# Patient Record
Sex: FEMALE | Race: WHITE | HISPANIC OR LATINO
[De-identification: names, ages, dates, MRNs, and addresses within clinical notes are randomized per-mention and may not be internally consistent; named-entity substitution may affect disease eponyms.]

---

## 2020-11-11 VITALS — BODY MASS INDEX: 43.99 KG/M2 | HEIGHT: 61 IN | WEIGHT: 233 LBS

## 2020-12-30 VITALS — HEIGHT: 61 IN | WEIGHT: 237 LBS | BODY MASS INDEX: 44.75 KG/M2

## 2021-01-29 VITALS — HEIGHT: 61 IN | WEIGHT: 240 LBS | BODY MASS INDEX: 45.31 KG/M2

## 2021-02-26 VITALS — HEIGHT: 61 IN | BODY MASS INDEX: 45.88 KG/M2 | WEIGHT: 243 LBS

## 2021-03-16 VITALS — BODY MASS INDEX: 45.31 KG/M2 | WEIGHT: 240 LBS | HEIGHT: 61 IN

## 2021-04-07 VITALS — HEIGHT: 61 IN | WEIGHT: 240 LBS | BODY MASS INDEX: 45.31 KG/M2

## 2021-05-07 VITALS — HEIGHT: 61 IN | BODY MASS INDEX: 45.5 KG/M2 | WEIGHT: 241 LBS

## 2021-06-01 ENCOUNTER — APPOINTMENT (OUTPATIENT)
Dept: SURGERY | Facility: CLINIC | Age: 61
End: 2021-06-01
Payer: MEDICAID

## 2021-06-01 VITALS
OXYGEN SATURATION: 96 % | SYSTOLIC BLOOD PRESSURE: 153 MMHG | HEIGHT: 61 IN | TEMPERATURE: 97.1 F | HEART RATE: 78 BPM | BODY MASS INDEX: 44.22 KG/M2 | DIASTOLIC BLOOD PRESSURE: 89 MMHG | WEIGHT: 234.25 LBS

## 2021-06-01 PROBLEM — Z00.00 ENCOUNTER FOR PREVENTIVE HEALTH EXAMINATION: Status: ACTIVE | Noted: 2021-06-01

## 2021-06-01 PROCEDURE — 99204 OFFICE O/P NEW MOD 45 MIN: CPT

## 2021-06-01 NOTE — HISTORY OF PRESENT ILLNESS
[de-identified] : Pt is a 61 year y/o Arabic-speaking F with PMHx of obesity (BMI 44), HTN, HLD, DM, cardiac arrhythmia, arthritis and PSHx of hysterectomy, tonsillectomy who presents today feeling well for initial bariatric consult. She has not done our online seminar. Pt has struggled with her weight for several years. She reports trying several diet and exercise regimens with no lasting weight loss success, including working with her PCP and a nutritionist for 7 months. Reports she would lose a few lbs but then gain even more back. States she walks for exercise daily. She denies smoking and drinking. She works as a home attendant. She is on Lisinopril 5 mg, Losartan 100 mg, Metoprolol 50 mg, amlodipine 10 mg, ASA 81 mg, Atorvastatin 40 mg and Januvia 100 mg. She occasionally takes Tylenol and cyclobenzaprine for joint pain. Pt states she recently did an EGD and colonoscopy.\par Patient to go for sleep study to evaluate any sleep disordered breathing associated with morbid obesity.

## 2021-06-01 NOTE — END OF VISIT
[FreeTextEntry3] : All medical record entries made by the Scribe were at my, Dr. Bob's, discretion and personally dictated by me on 06/01/2021. I have reviewed the chart and agree that the record accurately reflects my personal performance of the history, physical exam, assessment and plan. I have also personally directed, reviewed and agreed to the chart.

## 2021-06-01 NOTE — PLAN
[FreeTextEntry1] : Bariatric seminar\par Cardio clearance\par Send EGD and colonoscopy\par Will begin bariatric work up including sleep study to rule out any sleep disorders associated with morbid obesity.

## 2021-06-01 NOTE — ASSESSMENT
[FreeTextEntry1] : Pt is a 61 year y/o [place] Islander-speaking F with PMHx of obesity (BMI 44), HTN, HLD, DM, cardiac arrhythmia, arthritis and PSHx of hysterectomy, tonsillectomy who presents today feeling well for initial bariatric consult. Pt still needs to do our bariatric seminar. She will send us results from recent EGD and colonoscopy. She will require cardiac clearance. Will begin bariatric work up including sleep study to rule out any sleep disorders associated with morbid obesity.

## 2021-06-03 ENCOUNTER — NON-APPOINTMENT (OUTPATIENT)
Age: 61
End: 2021-06-03

## 2021-07-29 ENCOUNTER — APPOINTMENT (OUTPATIENT)
Dept: PULMONOLOGY | Facility: CLINIC | Age: 61
End: 2021-07-29
Payer: MEDICAID

## 2021-07-29 ENCOUNTER — OUTPATIENT (OUTPATIENT)
Dept: OUTPATIENT SERVICES | Facility: HOSPITAL | Age: 61
LOS: 1 days | End: 2021-07-29
Payer: MEDICAID

## 2021-07-29 VITALS
HEIGHT: 61 IN | SYSTOLIC BLOOD PRESSURE: 136 MMHG | TEMPERATURE: 97 F | DIASTOLIC BLOOD PRESSURE: 84 MMHG | WEIGHT: 231 LBS | RESPIRATION RATE: 12 BRPM | BODY MASS INDEX: 43.61 KG/M2 | HEART RATE: 75 BPM | OXYGEN SATURATION: 98 %

## 2021-07-29 DIAGNOSIS — Z01.818 ENCOUNTER FOR OTHER PREPROCEDURAL EXAMINATION: ICD-10-CM

## 2021-07-29 DIAGNOSIS — E78.2 MIXED HYPERLIPIDEMIA: ICD-10-CM

## 2021-07-29 DIAGNOSIS — Z86.79 PERSONAL HISTORY OF OTHER DISEASES OF THE CIRCULATORY SYSTEM: ICD-10-CM

## 2021-07-29 DIAGNOSIS — Z86.39 PERSONAL HISTORY OF OTHER ENDOCRINE, NUTRITIONAL AND METABOLIC DISEASE: ICD-10-CM

## 2021-07-29 PROCEDURE — ZZZZZ: CPT

## 2021-07-29 PROCEDURE — 94060 EVALUATION OF WHEEZING: CPT

## 2021-07-29 PROCEDURE — 99203 OFFICE O/P NEW LOW 30 MIN: CPT | Mod: 25

## 2021-07-29 PROCEDURE — 71046 X-RAY EXAM CHEST 2 VIEWS: CPT | Mod: 26

## 2021-07-29 PROCEDURE — 94726 PLETHYSMOGRAPHY LUNG VOLUMES: CPT

## 2021-07-29 PROCEDURE — 94729 DIFFUSING CAPACITY: CPT

## 2021-07-29 PROCEDURE — 71046 X-RAY EXAM CHEST 2 VIEWS: CPT

## 2021-07-29 NOTE — PHYSICAL EXAM
[No Acute Distress] : no acute distress [Normal Oropharynx] : normal oropharynx [III] : Mallampati Class: III [Normal Appearance] : normal appearance [No Neck Mass] : no neck mass [Normal Rate/Rhythm] : normal rate/rhythm [Normal S1, S2] : normal s1, s2 [No Murmurs] : no murmurs [No Resp Distress] : no resp distress [Clear to Auscultation Bilaterally] : clear to auscultation bilaterally [No Abnormalities] : no abnormalities [Benign] : benign [Normal Gait] : normal gait [No Clubbing] : no clubbing [No Edema] : no edema [Normal Color/ Pigmentation] : normal color/ pigmentation [No Focal Deficits] : no focal deficits [Oriented x3] : oriented x3 [Normal Affect] : normal affect

## 2021-07-30 RX ORDER — SITAGLIPTIN 100 MG/1
TABLET, FILM COATED ORAL
Refills: 0 | Status: ACTIVE | COMMUNITY

## 2021-07-30 RX ORDER — BISOPROLOL FUMARATE 5 MG/1
5 TABLET, FILM COATED ORAL
Refills: 0 | Status: ACTIVE | COMMUNITY

## 2021-07-30 RX ORDER — CYCLOBENZAPRINE HYDROCHLORIDE 10 MG/1
10 TABLET, FILM COATED ORAL
Refills: 0 | Status: ACTIVE | COMMUNITY

## 2021-07-30 RX ORDER — CYANOCOBALAMIN 1000 UG/ML
1000 INJECTION INTRAMUSCULAR; SUBCUTANEOUS
Refills: 0 | Status: ACTIVE | COMMUNITY

## 2021-07-30 RX ORDER — LOSARTAN POTASSIUM 100 MG/1
TABLET, FILM COATED ORAL
Refills: 0 | Status: ACTIVE | COMMUNITY

## 2021-07-30 RX ORDER — ATORVASTATIN CALCIUM 80 MG/1
TABLET, FILM COATED ORAL
Refills: 0 | Status: ACTIVE | COMMUNITY

## 2021-07-30 RX ORDER — OMEPRAZOLE 20 MG/1
TABLET, DELAYED RELEASE ORAL
Refills: 0 | Status: ACTIVE | COMMUNITY

## 2021-07-30 RX ORDER — ALPRAZOLAM 2 MG/1
TABLET ORAL
Refills: 0 | Status: ACTIVE | COMMUNITY

## 2021-07-30 RX ORDER — KETOROLAC TROMETHAMINE 10 MG/1
10 TABLET, FILM COATED ORAL
Refills: 0 | Status: ACTIVE | COMMUNITY

## 2021-07-30 NOTE — HISTORY OF PRESENT ILLNESS
[Never] : never [Obstructive Sleep Apnea] : obstructive sleep apnea [TextBox_4] : Ms Nichols, 61 year old female, presents to clinic for pulmonary preoperative risk stratification. States that she is to have gastric bariatric reduction surgery done soon. States that she is a never smoker with no hx of any lung issues. She denies feeling SOB and states that she is able to walk a couple of hours a day and climb 2 flights of stairs with minimal issues. She does admit to a hx of sleep apnea and seen a sleep doctor recently with  a AHI of 10 and planned for PAP therapy but has yet to receive a machine. States difficulty initiating sleep feels that she never really falls asleep and also awakens with a dry mouth but denies being told that she snores. Lives at home with no pets and denies any known exposure hx as well.\par \par PAP ordered by Dr. Wilhelm

## 2021-07-30 NOTE — ASSESSMENT
[FreeTextEntry1] : REVIEWED\par 1. PFTs 7/2021: FEV1 112; FEV1/; TLC 93; DLCO 102\par 2. CXR 7/2021: no obvious pleural or parenchymal abnormalities\par 3. PSG 6/2021: AHI 11.7; T 80-89 7%\par \par 61 year old female with DM, HTN, HLD, anxiety referred by Dr. Bob for pre operative risk assessment for bariatric surgery. Recently diagnosed with mild CYRUS and is pending receipt of PAP; managed by PCP Dr. Wilhelm. PFTs and CXR without concerning pathology.  No further optimization is required prior to proceeding with surgery. The anesthesia team should follow ASA CYRUS guidelines including extubation when fully awake and upright, judicious use of opiates, and prolonged post operative monitoring. Follow up with me as needed.\par \par \par \par

## 2021-07-30 NOTE — REVIEW OF SYSTEMS
[Diabetes] : diabetes [Fever] : no fever [Fatigue] : no fatigue [Chills] : no chills [Dry Eyes] : no dry eyes [Eye Irritation] : no eye irritation [Cough] : no cough [Sputum] : no sputum [Dyspnea] : no dyspnea [Chest Discomfort] : no chest discomfort [Orthopnea] : no orthopnea [Hay Fever] : no hay fever [Nasal Discharge] : no nasal discharge [GERD] : no gerd [Abdominal Pain] : no abdominal pain [Nocturia] : no nocturia [Dysuria] : no dysuria [Arthralgias] : no arthralgias [Anemia] : no anemia [Headache] : no headache [Dizziness] : no dizziness [Depression] : no depression

## 2021-07-30 NOTE — CONSULT LETTER
[Dear  ___] : Dear  [unfilled], [Consult Letter:] : I had the pleasure of evaluating your patient, [unfilled]. [Please see my note below.] : Please see my note below. [Consult Closing:] : Thank you very much for allowing me to participate in the care of this patient.  If you have any questions, please do not hesitate to contact me. [Sincerely,] : Sincerely, [FreeTextEntry3] : Aydee Joshi MD\par \par [facility] [facility] [employer] [facility]\par Pulmonary, Critical Care, and Sleep Medicine\par

## 2021-08-02 ENCOUNTER — APPOINTMENT (OUTPATIENT)
Dept: BARIATRICS | Facility: CLINIC | Age: 61
End: 2021-08-02
Payer: MEDICAID

## 2021-08-02 PROCEDURE — 98968 PH1 ASSMT&MGMT NQHP 21-30: CPT

## 2021-08-06 ENCOUNTER — APPOINTMENT (OUTPATIENT)
Dept: BARIATRICS | Facility: CLINIC | Age: 61
End: 2021-08-06
Payer: MEDICAID

## 2021-08-06 VITALS
OXYGEN SATURATION: 96 % | HEIGHT: 61 IN | TEMPERATURE: 97.1 F | WEIGHT: 231 LBS | HEART RATE: 73 BPM | BODY MASS INDEX: 43.61 KG/M2 | DIASTOLIC BLOOD PRESSURE: 81 MMHG | SYSTOLIC BLOOD PRESSURE: 122 MMHG

## 2021-08-06 DIAGNOSIS — G47.33 OBSTRUCTIVE SLEEP APNEA (ADULT) (PEDIATRIC): ICD-10-CM

## 2021-08-06 PROCEDURE — 99213 OFFICE O/P EST LOW 20 MIN: CPT

## 2021-08-06 NOTE — END OF VISIT
[FreeTextEntry3] : All medical record entries made by the Scribe were at my, Dr. Bob's, discretion and personally dictated by me on 08/06/2021. I have reviewed the chart and agree that the record accurately reflects my personal performance of the history, physical exam, assessment and plan. I have also personally directed, reviewed and agreed to the chart.

## 2021-08-06 NOTE — HISTORY OF PRESENT ILLNESS
[de-identified] : Pt is a 61 year y/o Gabonese-speaking F with PMHx of obesity (BMI 44), HTN, HLD, DM, cardiac arrhythmia, arthritis and PSHx of hysterectomy, tonsillectomy who presents today feeling well for final bariatric consult. Pt states she has completed her bariatric workup but doesn't know which surgery she is interested in. She was diagnosed with CYRUS and will start using a CPAP machine in a few days.

## 2021-08-06 NOTE — ASSESSMENT
[FreeTextEntry1] : Pt is a 61 year y/o Tunisian-speaking F with PMHx of obesity (BMI 44), HTN, HLD, DM, cardiac arrhythmia, arthritis and PSHx of hysterectomy, tonsillectomy who presents today feeling well for final bariatric consult, interested in gastric bypass.  The risks, benefits, and alternatives to the proposed procedure were discussed with the patient and all questions were answered to their satisfaction. Will schedule RYGB.

## 2021-09-02 ENCOUNTER — APPOINTMENT (OUTPATIENT)
Dept: BARIATRICS | Facility: CLINIC | Age: 61
End: 2021-09-02

## 2021-09-14 ENCOUNTER — NON-APPOINTMENT (OUTPATIENT)
Age: 61
End: 2021-09-14

## 2021-09-24 VITALS
HEART RATE: 67 BPM | SYSTOLIC BLOOD PRESSURE: 148 MMHG | OXYGEN SATURATION: 99 % | HEIGHT: 61.02 IN | RESPIRATION RATE: 16 BRPM | TEMPERATURE: 98 F | DIASTOLIC BLOOD PRESSURE: 53 MMHG | WEIGHT: 218.92 LBS

## 2021-09-24 RX ORDER — ASPIRIN/CALCIUM CARB/MAGNESIUM 324 MG
0 TABLET ORAL
Qty: 0 | Refills: 0 | DISCHARGE

## 2021-09-24 NOTE — PRE-OP CHECKLIST - SELECT TESTS ORDERED
BMP/CBC/PT/PTT/INR/Urinalysis/EKG/CXR BMP/CBC/PT/PTT/INR/Urinalysis/EKG/CXR/POCT Blood Glucose BMP/CBC/PT/PTT/INR/Urinalysis/EKG/CXR/POCT Blood Glucose/COVID-19

## 2021-09-27 ENCOUNTER — APPOINTMENT (OUTPATIENT)
Dept: BARIATRICS | Facility: HOSPITAL | Age: 61
End: 2021-09-27
Payer: MEDICAID

## 2021-09-27 ENCOUNTER — INPATIENT (INPATIENT)
Facility: HOSPITAL | Age: 61
LOS: 1 days | Discharge: HOME | DRG: 621 | End: 2021-09-29
Attending: SURGERY | Admitting: SURGERY
Payer: MEDICAID

## 2021-09-27 DIAGNOSIS — Z90.89 ACQUIRED ABSENCE OF OTHER ORGANS: Chronic | ICD-10-CM

## 2021-09-27 DIAGNOSIS — Z90.710 ACQUIRED ABSENCE OF BOTH CERVIX AND UTERUS: Chronic | ICD-10-CM

## 2021-09-27 DIAGNOSIS — Z98.890 OTHER SPECIFIED POSTPROCEDURAL STATES: Chronic | ICD-10-CM

## 2021-09-27 DIAGNOSIS — E66.01 MORBID (SEVERE) OBESITY DUE TO EXCESS CALORIES: ICD-10-CM

## 2021-09-27 LAB
BLD GP AB SCN SERPL QL: NEGATIVE — SIGNIFICANT CHANGE UP
GLUCOSE BLDC GLUCOMTR-MCNC: 184 MG/DL — HIGH (ref 70–99)
GLUCOSE BLDC GLUCOMTR-MCNC: 219 MG/DL — HIGH (ref 70–99)
GLUCOSE BLDC GLUCOMTR-MCNC: 235 MG/DL — HIGH (ref 70–99)
GLUCOSE BLDC GLUCOMTR-MCNC: 238 MG/DL — HIGH (ref 70–99)
HCT VFR BLD CALC: 35.5 % — SIGNIFICANT CHANGE UP (ref 34.5–45)
HGB BLD-MCNC: 11.6 G/DL — SIGNIFICANT CHANGE UP (ref 11.5–15.5)
MCHC RBC-ENTMCNC: 32.3 PG — SIGNIFICANT CHANGE UP (ref 27–34)
MCHC RBC-ENTMCNC: 32.7 GM/DL — SIGNIFICANT CHANGE UP (ref 32–36)
MCV RBC AUTO: 98.9 FL — SIGNIFICANT CHANGE UP (ref 80–100)
NRBC # BLD: 0 /100 WBCS — SIGNIFICANT CHANGE UP (ref 0–0)
PLATELET # BLD AUTO: 348 K/UL — SIGNIFICANT CHANGE UP (ref 150–400)
RBC # BLD: 3.59 M/UL — LOW (ref 3.8–5.2)
RBC # FLD: 14.5 % — SIGNIFICANT CHANGE UP (ref 10.3–14.5)
RH IG SCN BLD-IMP: NEGATIVE — SIGNIFICANT CHANGE UP
SARS-COV-2 RNA SPEC QL NAA+PROBE: SIGNIFICANT CHANGE UP
WBC # BLD: 16.98 K/UL — HIGH (ref 3.8–10.5)
WBC # FLD AUTO: 16.98 K/UL — HIGH (ref 3.8–10.5)

## 2021-09-27 PROCEDURE — S2900 ROBOTIC SURGICAL SYSTEM: CPT | Mod: NC

## 2021-09-27 PROCEDURE — 43645 LAP GASTR BYPASS INCL SMLL I: CPT | Mod: GC

## 2021-09-27 RX ORDER — METOPROLOL TARTRATE 50 MG
50 TABLET ORAL
Refills: 0 | Status: DISCONTINUED | OUTPATIENT
Start: 2021-09-27 | End: 2021-09-27

## 2021-09-27 RX ORDER — ACETAMINOPHEN 500 MG
650 TABLET ORAL EVERY 6 HOURS
Refills: 0 | Status: DISCONTINUED | OUTPATIENT
Start: 2021-09-27 | End: 2021-09-27

## 2021-09-27 RX ORDER — SODIUM CHLORIDE 9 MG/ML
1000 INJECTION, SOLUTION INTRAVENOUS
Refills: 0 | Status: DISCONTINUED | OUTPATIENT
Start: 2021-09-27 | End: 2021-09-29

## 2021-09-27 RX ORDER — ACETAMINOPHEN 500 MG
1000 TABLET ORAL ONCE
Refills: 0 | Status: COMPLETED | OUTPATIENT
Start: 2021-09-27 | End: 2021-09-27

## 2021-09-27 RX ORDER — ONDANSETRON 8 MG/1
4 TABLET, FILM COATED ORAL EVERY 6 HOURS
Refills: 0 | Status: DISCONTINUED | OUTPATIENT
Start: 2021-09-27 | End: 2021-09-29

## 2021-09-27 RX ORDER — ACETAMINOPHEN 500 MG
650 TABLET ORAL EVERY 6 HOURS
Refills: 0 | Status: DISCONTINUED | OUTPATIENT
Start: 2021-09-27 | End: 2021-09-29

## 2021-09-27 RX ORDER — AMLODIPINE BESYLATE 2.5 MG/1
5 TABLET ORAL DAILY
Refills: 0 | Status: DISCONTINUED | OUTPATIENT
Start: 2021-09-27 | End: 2021-09-27

## 2021-09-27 RX ORDER — KETOROLAC TROMETHAMINE 30 MG/ML
15 SYRINGE (ML) INJECTION EVERY 6 HOURS
Refills: 0 | Status: DISCONTINUED | OUTPATIENT
Start: 2021-09-27 | End: 2021-09-29

## 2021-09-27 RX ORDER — PANTOPRAZOLE SODIUM 20 MG/1
40 TABLET, DELAYED RELEASE ORAL DAILY
Refills: 0 | Status: DISCONTINUED | OUTPATIENT
Start: 2021-09-27 | End: 2021-09-29

## 2021-09-27 RX ORDER — HYDROMORPHONE HYDROCHLORIDE 2 MG/ML
0.5 INJECTION INTRAMUSCULAR; INTRAVENOUS; SUBCUTANEOUS ONCE
Refills: 0 | Status: DISCONTINUED | OUTPATIENT
Start: 2021-09-27 | End: 2021-09-27

## 2021-09-27 RX ORDER — INSULIN LISPRO 100/ML
VIAL (ML) SUBCUTANEOUS
Refills: 0 | Status: DISCONTINUED | OUTPATIENT
Start: 2021-09-27 | End: 2021-09-29

## 2021-09-27 RX ORDER — GABAPENTIN 400 MG/1
300 CAPSULE ORAL ONCE
Refills: 0 | Status: COMPLETED | OUTPATIENT
Start: 2021-09-27 | End: 2021-09-27

## 2021-09-27 RX ORDER — SCOPALAMINE 1 MG/3D
1 PATCH, EXTENDED RELEASE TRANSDERMAL ONCE
Refills: 0 | Status: COMPLETED | OUTPATIENT
Start: 2021-09-27 | End: 2021-09-27

## 2021-09-27 RX ORDER — DEXTROSE 50 % IN WATER 50 %
25 SYRINGE (ML) INTRAVENOUS ONCE
Refills: 0 | Status: DISCONTINUED | OUTPATIENT
Start: 2021-09-27 | End: 2021-09-29

## 2021-09-27 RX ORDER — BUPIVACAINE 13.3 MG/ML
20 INJECTION, SUSPENSION, LIPOSOMAL INFILTRATION ONCE
Refills: 0 | Status: DISCONTINUED | OUTPATIENT
Start: 2021-09-27 | End: 2021-09-29

## 2021-09-27 RX ORDER — ENOXAPARIN SODIUM 100 MG/ML
40 INJECTION SUBCUTANEOUS ONCE
Refills: 0 | Status: COMPLETED | OUTPATIENT
Start: 2021-09-27 | End: 2021-09-27

## 2021-09-27 RX ORDER — DEXTROSE 50 % IN WATER 50 %
12.5 SYRINGE (ML) INTRAVENOUS ONCE
Refills: 0 | Status: DISCONTINUED | OUTPATIENT
Start: 2021-09-27 | End: 2021-09-29

## 2021-09-27 RX ORDER — GLUCAGON INJECTION, SOLUTION 0.5 MG/.1ML
1 INJECTION, SOLUTION SUBCUTANEOUS ONCE
Refills: 0 | Status: DISCONTINUED | OUTPATIENT
Start: 2021-09-27 | End: 2021-09-29

## 2021-09-27 RX ORDER — SODIUM CHLORIDE 9 MG/ML
1000 INJECTION, SOLUTION INTRAVENOUS
Refills: 0 | Status: DISCONTINUED | OUTPATIENT
Start: 2021-09-27 | End: 2021-09-28

## 2021-09-27 RX ORDER — DEXTROSE 50 % IN WATER 50 %
15 SYRINGE (ML) INTRAVENOUS ONCE
Refills: 0 | Status: DISCONTINUED | OUTPATIENT
Start: 2021-09-27 | End: 2021-09-29

## 2021-09-27 RX ADMIN — Medication 4: at 18:33

## 2021-09-27 RX ADMIN — SCOPALAMINE 1 PATCH: 1 PATCH, EXTENDED RELEASE TRANSDERMAL at 19:52

## 2021-09-27 RX ADMIN — HYDROMORPHONE HYDROCHLORIDE 0.5 MILLIGRAM(S): 2 INJECTION INTRAMUSCULAR; INTRAVENOUS; SUBCUTANEOUS at 13:22

## 2021-09-27 RX ADMIN — Medication 1000 MILLIGRAM(S): at 18:41

## 2021-09-27 RX ADMIN — HYDROMORPHONE HYDROCHLORIDE 0.5 MILLIGRAM(S): 2 INJECTION INTRAMUSCULAR; INTRAVENOUS; SUBCUTANEOUS at 14:21

## 2021-09-27 RX ADMIN — HYDROMORPHONE HYDROCHLORIDE 0.5 MILLIGRAM(S): 2 INJECTION INTRAMUSCULAR; INTRAVENOUS; SUBCUTANEOUS at 13:59

## 2021-09-27 RX ADMIN — HYDROMORPHONE HYDROCHLORIDE 0.5 MILLIGRAM(S): 2 INJECTION INTRAMUSCULAR; INTRAVENOUS; SUBCUTANEOUS at 11:52

## 2021-09-27 RX ADMIN — Medication 400 MILLIGRAM(S): at 12:26

## 2021-09-27 RX ADMIN — GABAPENTIN 300 MILLIGRAM(S): 400 CAPSULE ORAL at 07:53

## 2021-09-27 RX ADMIN — Medication 4: at 21:58

## 2021-09-27 RX ADMIN — Medication 15 MILLIGRAM(S): at 21:46

## 2021-09-27 RX ADMIN — Medication 15 MILLIGRAM(S): at 21:26

## 2021-09-27 RX ADMIN — ONDANSETRON 4 MILLIGRAM(S): 8 TABLET, FILM COATED ORAL at 18:08

## 2021-09-27 RX ADMIN — Medication 400 MILLIGRAM(S): at 17:59

## 2021-09-27 RX ADMIN — Medication 1000 MILLIGRAM(S): at 13:59

## 2021-09-27 RX ADMIN — Medication: at 12:00

## 2021-09-27 RX ADMIN — SCOPALAMINE 1 PATCH: 1 PATCH, EXTENDED RELEASE TRANSDERMAL at 07:52

## 2021-09-27 RX ADMIN — PANTOPRAZOLE SODIUM 40 MILLIGRAM(S): 20 TABLET, DELAYED RELEASE ORAL at 11:53

## 2021-09-27 RX ADMIN — ENOXAPARIN SODIUM 40 MILLIGRAM(S): 100 INJECTION SUBCUTANEOUS at 07:53

## 2021-09-27 RX ADMIN — Medication 1000 MILLIGRAM(S): at 07:53

## 2021-09-27 NOTE — BRIEF OPERATIVE NOTE - OPERATION/FINDINGS
normal gastric anatomy    Veress/Visiport access  Robot docked  6cm gastric pouch created over 32Fr bougie  150cm BP limb measured  handsewn gastrojejunostomy in 2 layers of PDS  100cm Garo limb measured  stapled jejunojejunostomy created with white loads  Ji's and mesenteric defects closed with nonabsorbable quill suture  hemostasis assured

## 2021-09-27 NOTE — BRIEF OPERATIVE NOTE - NSICDXBRIEFPROCEDURE_GEN_ALL_CORE_FT
PROCEDURES:  Robot-assisted laparoscopic Garo-en-Y gastric bypass 27-Sep-2021 13:25:38  Catrina Bernstein

## 2021-09-28 LAB
A1C WITH ESTIMATED AVERAGE GLUCOSE RESULT: 8.3 % — HIGH (ref 4–5.6)
ANION GAP SERPL CALC-SCNC: 11 MMOL/L — SIGNIFICANT CHANGE UP (ref 5–17)
BASOPHILS # BLD AUTO: 0.03 K/UL — SIGNIFICANT CHANGE UP (ref 0–0.2)
BASOPHILS NFR BLD AUTO: 0.2 % — SIGNIFICANT CHANGE UP (ref 0–2)
BUN SERPL-MCNC: 13 MG/DL — SIGNIFICANT CHANGE UP (ref 7–23)
CALCIUM SERPL-MCNC: 8.8 MG/DL — SIGNIFICANT CHANGE UP (ref 8.4–10.5)
CHLORIDE SERPL-SCNC: 107 MMOL/L — SIGNIFICANT CHANGE UP (ref 96–108)
CO2 SERPL-SCNC: 23 MMOL/L — SIGNIFICANT CHANGE UP (ref 22–31)
COVID-19 SPIKE DOMAIN AB INTERP: POSITIVE
COVID-19 SPIKE DOMAIN ANTIBODY RESULT: >250 U/ML — HIGH
CREAT SERPL-MCNC: 0.88 MG/DL — SIGNIFICANT CHANGE UP (ref 0.5–1.3)
EOSINOPHIL # BLD AUTO: 0.02 K/UL — SIGNIFICANT CHANGE UP (ref 0–0.5)
EOSINOPHIL NFR BLD AUTO: 0.1 % — SIGNIFICANT CHANGE UP (ref 0–6)
ESTIMATED AVERAGE GLUCOSE: 192 MG/DL — HIGH (ref 68–114)
GLUCOSE BLDC GLUCOMTR-MCNC: 135 MG/DL — HIGH (ref 70–99)
GLUCOSE BLDC GLUCOMTR-MCNC: 148 MG/DL — HIGH (ref 70–99)
GLUCOSE BLDC GLUCOMTR-MCNC: 158 MG/DL — HIGH (ref 70–99)
GLUCOSE BLDC GLUCOMTR-MCNC: 160 MG/DL — HIGH (ref 70–99)
GLUCOSE BLDC GLUCOMTR-MCNC: 162 MG/DL — HIGH (ref 70–99)
GLUCOSE SERPL-MCNC: 182 MG/DL — HIGH (ref 70–99)
HCT VFR BLD CALC: 33.6 % — LOW (ref 34.5–45)
HGB BLD-MCNC: 10.6 G/DL — LOW (ref 11.5–15.5)
IMM GRANULOCYTES NFR BLD AUTO: 1.5 % — SIGNIFICANT CHANGE UP (ref 0–1.5)
LYMPHOCYTES # BLD AUTO: 18.9 % — SIGNIFICANT CHANGE UP (ref 13–44)
LYMPHOCYTES # BLD AUTO: 2.93 K/UL — SIGNIFICANT CHANGE UP (ref 1–3.3)
MAGNESIUM SERPL-MCNC: 1.9 MG/DL — SIGNIFICANT CHANGE UP (ref 1.6–2.6)
MCHC RBC-ENTMCNC: 31.2 PG — SIGNIFICANT CHANGE UP (ref 27–34)
MCHC RBC-ENTMCNC: 31.5 GM/DL — LOW (ref 32–36)
MCV RBC AUTO: 98.8 FL — SIGNIFICANT CHANGE UP (ref 80–100)
MONOCYTES # BLD AUTO: 1.38 K/UL — HIGH (ref 0–0.9)
MONOCYTES NFR BLD AUTO: 8.9 % — SIGNIFICANT CHANGE UP (ref 2–14)
NEUTROPHILS # BLD AUTO: 10.91 K/UL — HIGH (ref 1.8–7.4)
NEUTROPHILS NFR BLD AUTO: 70.4 % — SIGNIFICANT CHANGE UP (ref 43–77)
NRBC # BLD: 0 /100 WBCS — SIGNIFICANT CHANGE UP (ref 0–0)
PHOSPHATE SERPL-MCNC: 2.8 MG/DL — SIGNIFICANT CHANGE UP (ref 2.5–4.5)
PLATELET # BLD AUTO: 351 K/UL — SIGNIFICANT CHANGE UP (ref 150–400)
POTASSIUM SERPL-MCNC: 4 MMOL/L — SIGNIFICANT CHANGE UP (ref 3.5–5.3)
POTASSIUM SERPL-SCNC: 4 MMOL/L — SIGNIFICANT CHANGE UP (ref 3.5–5.3)
RBC # BLD: 3.4 M/UL — LOW (ref 3.8–5.2)
RBC # FLD: 14.6 % — HIGH (ref 10.3–14.5)
SARS-COV-2 IGG+IGM SERPL QL IA: >250 U/ML — HIGH
SARS-COV-2 IGG+IGM SERPL QL IA: POSITIVE
SODIUM SERPL-SCNC: 141 MMOL/L — SIGNIFICANT CHANGE UP (ref 135–145)
WBC # BLD: 15.5 K/UL — HIGH (ref 3.8–10.5)
WBC # FLD AUTO: 15.5 K/UL — HIGH (ref 3.8–10.5)

## 2021-09-28 RX ORDER — ENOXAPARIN SODIUM 100 MG/ML
40 INJECTION SUBCUTANEOUS EVERY 12 HOURS
Refills: 0 | Status: DISCONTINUED | OUTPATIENT
Start: 2021-09-28 | End: 2021-09-29

## 2021-09-28 RX ORDER — SODIUM CHLORIDE 9 MG/ML
1000 INJECTION, SOLUTION INTRAVENOUS
Refills: 0 | Status: DISCONTINUED | OUTPATIENT
Start: 2021-09-28 | End: 2021-09-29

## 2021-09-28 RX ADMIN — Medication 15 MILLIGRAM(S): at 16:20

## 2021-09-28 RX ADMIN — Medication 650 MILLIGRAM(S): at 11:26

## 2021-09-28 RX ADMIN — Medication 2: at 07:50

## 2021-09-28 RX ADMIN — SCOPALAMINE 1 PATCH: 1 PATCH, EXTENDED RELEASE TRANSDERMAL at 18:47

## 2021-09-28 RX ADMIN — Medication 15 MILLIGRAM(S): at 09:11

## 2021-09-28 RX ADMIN — SCOPALAMINE 1 PATCH: 1 PATCH, EXTENDED RELEASE TRANSDERMAL at 08:30

## 2021-09-28 RX ADMIN — Medication 15 MILLIGRAM(S): at 09:40

## 2021-09-28 RX ADMIN — Medication 15 MILLIGRAM(S): at 04:35

## 2021-09-28 RX ADMIN — PANTOPRAZOLE SODIUM 40 MILLIGRAM(S): 20 TABLET, DELAYED RELEASE ORAL at 13:13

## 2021-09-28 RX ADMIN — Medication 650 MILLIGRAM(S): at 10:45

## 2021-09-28 RX ADMIN — Medication 2: at 18:22

## 2021-09-28 RX ADMIN — Medication 15 MILLIGRAM(S): at 21:28

## 2021-09-28 RX ADMIN — Medication 15 MILLIGRAM(S): at 04:55

## 2021-09-28 RX ADMIN — ENOXAPARIN SODIUM 40 MILLIGRAM(S): 100 INJECTION SUBCUTANEOUS at 18:23

## 2021-09-28 RX ADMIN — Medication 15 MILLIGRAM(S): at 22:20

## 2021-09-28 RX ADMIN — Medication 15 MILLIGRAM(S): at 15:38

## 2021-09-28 NOTE — PROGRESS NOTE ADULT - ASSESSMENT
61 year old female with history of morbid obesity ( BMI 41) , DM II, HTN, HLD , CYRUS s/p robotic assisted RnYGB 9/27  -Pain/nausea control  -BCLD, IVF  -Protonix  - Lovenox DVT ppx POD#1  -SCDs, OOB/A, IS  -AM labs  -Nutritional consult in AM

## 2021-09-29 ENCOUNTER — TRANSCRIPTION ENCOUNTER (OUTPATIENT)
Age: 61
End: 2021-09-29

## 2021-09-29 VITALS
OXYGEN SATURATION: 96 % | HEART RATE: 82 BPM | RESPIRATION RATE: 18 BRPM | SYSTOLIC BLOOD PRESSURE: 148 MMHG | TEMPERATURE: 99 F | DIASTOLIC BLOOD PRESSURE: 93 MMHG

## 2021-09-29 LAB
ANION GAP SERPL CALC-SCNC: 8 MMOL/L — SIGNIFICANT CHANGE UP (ref 5–17)
BUN SERPL-MCNC: 9 MG/DL — SIGNIFICANT CHANGE UP (ref 7–23)
CALCIUM SERPL-MCNC: 8.7 MG/DL — SIGNIFICANT CHANGE UP (ref 8.4–10.5)
CHLORIDE SERPL-SCNC: 107 MMOL/L — SIGNIFICANT CHANGE UP (ref 96–108)
CO2 SERPL-SCNC: 28 MMOL/L — SIGNIFICANT CHANGE UP (ref 22–31)
CREAT SERPL-MCNC: 0.74 MG/DL — SIGNIFICANT CHANGE UP (ref 0.5–1.3)
GLUCOSE BLDC GLUCOMTR-MCNC: 122 MG/DL — HIGH (ref 70–99)
GLUCOSE BLDC GLUCOMTR-MCNC: 136 MG/DL — HIGH (ref 70–99)
GLUCOSE SERPL-MCNC: 141 MG/DL — HIGH (ref 70–99)
HCT VFR BLD CALC: 29.6 % — LOW (ref 34.5–45)
HGB BLD-MCNC: 9.4 G/DL — LOW (ref 11.5–15.5)
MAGNESIUM SERPL-MCNC: 1.8 MG/DL — SIGNIFICANT CHANGE UP (ref 1.6–2.6)
MCHC RBC-ENTMCNC: 31.4 PG — SIGNIFICANT CHANGE UP (ref 27–34)
MCHC RBC-ENTMCNC: 31.8 GM/DL — LOW (ref 32–36)
MCV RBC AUTO: 99 FL — SIGNIFICANT CHANGE UP (ref 80–100)
NRBC # BLD: 0 /100 WBCS — SIGNIFICANT CHANGE UP (ref 0–0)
PHOSPHATE SERPL-MCNC: 1.7 MG/DL — LOW (ref 2.5–4.5)
PLATELET # BLD AUTO: 279 K/UL — SIGNIFICANT CHANGE UP (ref 150–400)
POTASSIUM SERPL-MCNC: 3.5 MMOL/L — SIGNIFICANT CHANGE UP (ref 3.5–5.3)
POTASSIUM SERPL-SCNC: 3.5 MMOL/L — SIGNIFICANT CHANGE UP (ref 3.5–5.3)
RBC # BLD: 2.99 M/UL — LOW (ref 3.8–5.2)
RBC # FLD: 14.6 % — HIGH (ref 10.3–14.5)
SODIUM SERPL-SCNC: 143 MMOL/L — SIGNIFICANT CHANGE UP (ref 135–145)
WBC # BLD: 10.52 K/UL — HIGH (ref 3.8–10.5)
WBC # FLD AUTO: 10.52 K/UL — HIGH (ref 3.8–10.5)

## 2021-09-29 PROCEDURE — 85027 COMPLETE CBC AUTOMATED: CPT

## 2021-09-29 PROCEDURE — 82962 GLUCOSE BLOOD TEST: CPT

## 2021-09-29 PROCEDURE — S2900: CPT

## 2021-09-29 PROCEDURE — 83036 HEMOGLOBIN GLYCOSYLATED A1C: CPT

## 2021-09-29 PROCEDURE — 36415 COLL VENOUS BLD VENIPUNCTURE: CPT

## 2021-09-29 PROCEDURE — 86769 SARS-COV-2 COVID-19 ANTIBODY: CPT

## 2021-09-29 PROCEDURE — 87635 SARS-COV-2 COVID-19 AMP PRB: CPT

## 2021-09-29 PROCEDURE — 86900 BLOOD TYPING SEROLOGIC ABO: CPT

## 2021-09-29 PROCEDURE — 94660 CPAP INITIATION&MGMT: CPT

## 2021-09-29 PROCEDURE — 84100 ASSAY OF PHOSPHORUS: CPT

## 2021-09-29 PROCEDURE — C1889: CPT

## 2021-09-29 PROCEDURE — 86901 BLOOD TYPING SEROLOGIC RH(D): CPT

## 2021-09-29 PROCEDURE — 83735 ASSAY OF MAGNESIUM: CPT

## 2021-09-29 PROCEDURE — 86850 RBC ANTIBODY SCREEN: CPT

## 2021-09-29 PROCEDURE — 85025 COMPLETE CBC W/AUTO DIFF WBC: CPT

## 2021-09-29 PROCEDURE — 80048 BASIC METABOLIC PNL TOTAL CA: CPT

## 2021-09-29 RX ORDER — PANTOPRAZOLE SODIUM 20 MG/1
1 TABLET, DELAYED RELEASE ORAL
Qty: 30 | Refills: 0
Start: 2021-09-29 | End: 2021-10-28

## 2021-09-29 RX ORDER — ACETAMINOPHEN 500 MG
2 TABLET ORAL
Qty: 32 | Refills: 0
Start: 2021-09-29 | End: 2021-10-02

## 2021-09-29 RX ORDER — METOPROLOL TARTRATE 50 MG
200 TABLET ORAL DAILY
Refills: 0 | Status: DISCONTINUED | OUTPATIENT
Start: 2021-09-29 | End: 2021-09-29

## 2021-09-29 RX ORDER — POTASSIUM PHOSPHATE, MONOBASIC POTASSIUM PHOSPHATE, DIBASIC 236; 224 MG/ML; MG/ML
30 INJECTION, SOLUTION INTRAVENOUS ONCE
Refills: 0 | Status: COMPLETED | OUTPATIENT
Start: 2021-09-29 | End: 2021-09-29

## 2021-09-29 RX ORDER — SITAGLIPTIN 50 MG/1
1 TABLET, FILM COATED ORAL
Qty: 0 | Refills: 0 | DISCHARGE

## 2021-09-29 RX ORDER — LOSARTAN POTASSIUM 100 MG/1
1 TABLET, FILM COATED ORAL
Qty: 0 | Refills: 0 | DISCHARGE

## 2021-09-29 RX ORDER — OMEPRAZOLE 10 MG/1
1 CAPSULE, DELAYED RELEASE ORAL
Qty: 0 | Refills: 0 | DISCHARGE

## 2021-09-29 RX ADMIN — PANTOPRAZOLE SODIUM 40 MILLIGRAM(S): 20 TABLET, DELAYED RELEASE ORAL at 11:37

## 2021-09-29 RX ADMIN — SODIUM CHLORIDE 75 MILLILITER(S): 9 INJECTION, SOLUTION INTRAVENOUS at 00:01

## 2021-09-29 RX ADMIN — POTASSIUM PHOSPHATE, MONOBASIC POTASSIUM PHOSPHATE, DIBASIC 83.33 MILLIMOLE(S): 236; 224 INJECTION, SOLUTION INTRAVENOUS at 13:30

## 2021-09-29 RX ADMIN — ENOXAPARIN SODIUM 40 MILLIGRAM(S): 100 INJECTION SUBCUTANEOUS at 05:08

## 2021-09-29 RX ADMIN — Medication 15 MILLIGRAM(S): at 09:30

## 2021-09-29 RX ADMIN — Medication 15 MILLIGRAM(S): at 05:08

## 2021-09-29 RX ADMIN — Medication 200 MILLIGRAM(S): at 12:48

## 2021-09-29 RX ADMIN — Medication 15 MILLIGRAM(S): at 06:00

## 2021-09-29 RX ADMIN — Medication 15 MILLIGRAM(S): at 09:05

## 2021-09-29 RX ADMIN — SCOPALAMINE 1 PATCH: 1 PATCH, EXTENDED RELEASE TRANSDERMAL at 06:03

## 2021-09-29 NOTE — DISCHARGE NOTE NURSING/CASE MANAGEMENT/SOCIAL WORK - PATIENT PORTAL LINK FT
You can access the FollowMyHealth Patient Portal offered by [facility] by registering at the following website: [email or web address]. By joining [employer]’s FollowMyHealth portal, you will also be able to view your health information using other applications (apps) compatible with our system.

## 2021-09-29 NOTE — DIETITIAN NUTRITION RISK NOTIFICATION - TREATMENT: THE FOLLOWING DIET HAS BEEN RECOMMENDED
1. BARICLLIQ diet   2. Recommend advance to phase 1 bariatric full liquid diet when medically feasible   3. Encourage adequate hydration with goal of 4oz/hr and/or 64 oz/day

## 2021-09-29 NOTE — DISCHARGE NOTE PROVIDER - DETAILS OF MALNUTRITION DIAGNOSIS/DIAGNOSES
This patient has been assessed with a concern for Malnutrition and was treated during this hospitalization for the following Nutrition diagnosis/diagnoses:     -  09/29/2021: Morbid obesity (BMI > 40)

## 2021-09-29 NOTE — PROVIDER CONTACT NOTE (OTHER) - NAME OF MD/NP/PA/DO NOTIFIED:
KENNEDY Worthington via [employer] [phone] = understands/communicates without difficulty

## 2021-09-29 NOTE — DISCHARGE NOTE PROVIDER - NSDCMRMEDTOKEN_GEN_ALL_CORE_FT
bisoprolol 10 mg oral tablet: 1 tab(s) orally once a day  docusate potassium 100 mg oral capsule:   Lipitor 40 mg oral tablet: 1 tab(s) orally once a day  Norvasc 5 mg oral tablet: 1 tab(s) orally once a day  Omega-3 1000 mg oral capsule: 1 cap(s) orally once a day  Protonix 40 mg oral delayed release tablet: 1 tab(s) orally once a day MDD:1 tablet  Tylenol Regular Strength 325 mg oral tablet: 2 tab(s) orally every 6 hours, As Needed -for moderate pain - for severe pain MDD:8 tablets

## 2021-09-29 NOTE — DISCHARGE NOTE PROVIDER - HOSPITAL COURSE
61 year old female with history of morbid obesity ( BMI 41) , DM II, HTN, HLD , CYRUS s/p robotic assisted RnYGB 9/27Pt tolerated the procedure well. At time of discharge, pt was tolerating a bariatric clear liquid diet, and pt's pain was controlled. Plan is to follow up with Dr. Bob in the office.

## 2021-09-29 NOTE — DISCHARGE NOTE PROVIDER - NSDCCPCAREPLAN_GEN_ALL_CORE_FT
PRINCIPAL DISCHARGE DIAGNOSIS  Diagnosis: Morbid obesity  Assessment and Plan of Treatment: 61 year old female with history of morbid obesity ( BMI 41) , DM II, HTN, HLD , CYRUS s/p robotic assisted RnYGB 9/27Pt tolerated the procedure well. At time of discharge, pt was tolerating a bariatric clear liquid diet, and pt's pain was controlled. Plan is to follow up with Dr. Bob in the office.  1) Please take Tylenol 650mg every 4 to 6 hours as needed for moderate to severe pain control. Please do not exceed 4,000 mg of Tylenol a day.  2) Please take Protonix 40 mg by mouth daily.         PRINCIPAL DISCHARGE DIAGNOSIS  Diagnosis: Morbid obesity  Assessment and Plan of Treatment: 61 year old female with history of morbid obesity ( BMI 41) , DM II, HTN, HLD , CYRUS s/p robotic assisted RnYGB 9/27Pt tolerated the procedure well. At time of discharge, pt was tolerating a bariatric clear liquid diet, and pt's pain was controlled. Plan is to follow up with Dr. Bob in the office.  1) Please take Tylenol 650mg every 4 to 6 hours as needed for moderate to severe pain control. Please do not exceed 4,000 mg of Tylenol a day.  2) Please take Protonix 40 mg by mouth daily.  3) PLEASE FOLLOW UP WITH YOUR CARDIOLOGIST AS SOON AS POSSIBLE TO ADJUST BLOOD PRESSURE MEDICATION.

## 2021-09-29 NOTE — PROGRESS NOTE ADULT - SUBJECTIVE AND OBJECTIVE BOX
INTERVAL HPI/OVERNIGHT EVENTS:    STATUS POST:      POST OPERATIVE DAY #: 1    SUBJECTIVE:      Vital Signs Last 24 Hrs  T(C): 36.6 (28 Sep 2021 05:09), Max: 37.3 (27 Sep 2021 11:07)  T(F): 97.9 (28 Sep 2021 05:09), Max: 99.2 (27 Sep 2021 11:07)  HR: 76 (28 Sep 2021 05:09) (76 - 91)  BP: 131/70 (28 Sep 2021 05:09) (123/62 - 141/79)  BP(mean): 89 (27 Sep 2021 13:22) (86 - 95)  RR: 16 (28 Sep 2021 05:09) (14 - 20)  SpO2: 97% (28 Sep 2021 05:09) (92% - 98%)  I&O's Detail    27 Sep 2021 07:01  -  28 Sep 2021 07:00  --------------------------------------------------------  IN:    Lactated Ringers: 2400 mL    Oral Fluid: 300 mL  Total IN: 2700 mL    OUT:    Voided (mL): 600 mL  Total OUT: 600 mL    Total NET: 2100 mL          General: NAD, resting comfortably in bed  C/V: NSR  Pulm: Nonlabored breathing, no respiratory distress  Abd: Soft, non-distended, TTP around incision site, incision clean dry and intact   Extrem: WWP, no edema, SCDs in place      LABS:                        10.6   15.50 )-----------( 351      ( 28 Sep 2021 06:52 )             33.6     09-28    141  |  107  |  13  ----------------------------<  182<H>  4.0   |  23  |  0.88    Ca    8.8      28 Sep 2021 06:52  Phos  2.8     09-28  Mg     1.9     09-28          
POC @ 13:30  POST-OPERATIVE NOTE    Procedure: Robot-assisted laparoscopic Garo-en-Y gastric bypass    Diagnosis/Indication: Morbid obesity    Surgeon: Dr. Bob    S: Pt seen an examined bedside with Camp . Pt c/o of moderate abdominal pain, but has no other complaints. Denies CP, SOB, calf tenderness. Pain controlled with medication. Denies nausea, vomiting.    O:  T(C): 37.3 (09-27-21 @ 11:07), Max: 37.3 (09-27-21 @ 11:07)  T(F): 99.2 (09-27-21 @ 11:07), Max: 99.2 (09-27-21 @ 11:07)  HR: 84 (09-27-21 @ 13:22) (78 - 88)  BP: 131/65 (09-27-21 @ 13:22) (123/62 - 139/66)  RR: 14 (09-27-21 @ 13:22) (14 - 18)  SpO2: 97% (09-27-21 @ 13:22) (93% - 98%)  Wt(kg): --            Gen: AAOx3, NAD, resting comfortably in bed  C/V: NSR  Pulm: Nonlabored breathing, no respiratory distress  Abd: soft, obese, nondistended, moderately TTP in upper quadrants, incisions CDI, no rebound, no guarding  Extrem: WWP, no calf edema or tenderness, SCDs in place      A/P: 61y Female s/p above procedure  Diet: BCLD  IVF:  Pain/nausea control  SQH/SCDs/OOBA/IS  Dispo pending pain control, PO tolerance, clinical improvement
INTERVAL HPI/OVERNIGHT EVENTS: no acute overnight events, tolerated diet    STATUS POST: Robotic Garo en Y Gastric Bypass    POST OPERATIVE DAY #: 2    SUBJECTIVE:  Patient is doing well this morning, seen at bedside with chief, denies any new complaints. Denies any nausea, vomiting, chest pain, shortness of breath, calf tenderness, fever or chills. Tolerating diet, ambulating as tolerated.    MEDICATIONS  (STANDING):  BUpivacaine liposome 1.3% Injectable (no eMAR) 20 milliLiter(s) Local Injection once  dextrose 40% Gel 15 Gram(s) Oral once  dextrose 5%. 1000 milliLiter(s) (50 mL/Hr) IV Continuous <Continuous>  dextrose 5%. 1000 milliLiter(s) (100 mL/Hr) IV Continuous <Continuous>  dextrose 50% Injectable 25 Gram(s) IV Push once  dextrose 50% Injectable 12.5 Gram(s) IV Push once  dextrose 50% Injectable 25 Gram(s) IV Push once  enoxaparin Injectable 40 milliGRAM(s) SubCutaneous every 12 hours  glucagon  Injectable 1 milliGRAM(s) IntraMuscular once  insulin lispro (ADMELOG) corrective regimen sliding scale   SubCutaneous Before meals and at bedtime  ketorolac   Injectable 15 milliGRAM(s) IV Push every 6 hours  lactated ringers. 1000 milliLiter(s) (75 mL/Hr) IV Continuous <Continuous>  pantoprazole  Injectable 40 milliGRAM(s) IV Push daily    MEDICATIONS  (PRN):  acetaminophen    Suspension .. 650 milliGRAM(s) Oral every 6 hours PRN Temp greater or equal to 38.5C (101.3F), Mild Pain (1 - 3)  ondansetron Injectable 4 milliGRAM(s) IV Push every 6 hours PRN Nausea      Vital Signs Last 24 Hrs  T(C): 36.7 (29 Sep 2021 05:06), Max: 37.3 (28 Sep 2021 09:02)  T(F): 98.1 (29 Sep 2021 05:06), Max: 99.1 (28 Sep 2021 09:02)  HR: 90 (29 Sep 2021 05:06) (82 - 96)  BP: 153/71 (29 Sep 2021 05:06) (137/65 - 160/77)  BP(mean): --  RR: 18 (29 Sep 2021 05:06) (16 - 19)  SpO2: 94% (29 Sep 2021 05:06) (94% - 97%)    PHYSICAL EXAM:  Constitutional: A&Ox3, AVSS, resting comfortably in bed     Respiratory: non labored breathing, no respiratory distress    Cardiovascular: NSR, RRR    Gastrointestinal: abdomen is soft, mildly tender to palpation around the incision sites, non distended, no rebound or guarding, incisions are clean dry and intact    Extremities: (-) edema, SCDs                  I&O's Detail    28 Sep 2021 07:01  -  29 Sep 2021 07:00  --------------------------------------------------------  IN:    Lactated Ringers: 1050 mL    Lactated Ringers: 1050 mL  Total IN: 2100 mL    OUT:    Voided (mL): 1400 mL  Total OUT: 1400 mL    Total NET: 700 mL          LABS:                        10.6   15.50 )-----------( 351      ( 28 Sep 2021 06:52 )             33.6     09-28    141  |  107  |  13  ----------------------------<  182<H>  4.0   |  23  |  0.88    Ca    8.8      28 Sep 2021 06:52  Phos  2.8     09-28  Mg     1.9     09-28            RADIOLOGY & ADDITIONAL STUDIES:

## 2021-09-29 NOTE — DISCHARGE NOTE PROVIDER - NSDCCPGOAL_GEN_ALL_CORE_FT
To get better and follow your care plan as instructed.    1) Please take Tylenol 650mg every 4 to 6 hours as needed for moderate to severe pain control. Please do not exceed 4,000 mg of Tylenol a day.  2) Please take Protonix 40 mg by mouth daily.     To get better and follow your care plan as instructed.  1) Please take Tylenol 650mg every 4 to 6 hours as needed for moderate to severe pain control. Please do not exceed 4,000 mg of Tylenol a day. 2) Please take Protonix 40 mg by mouth daily. 3) PLEASE FOLLOW UP WITH YOUR CARDIOLOGIST AS SOON AS POSSIBLE TO ADJUST BLOOD PRESSURE MEDICATION AFTER BARIATRIC SURGERY.    1) Please take Tylenol 650mg every 4 to 6 hours as needed for moderate to severe pain control. Please do not exceed 4,000 mg of Tylenol a day.  2) Please take Protonix 40 mg by mouth daily.

## 2021-09-29 NOTE — DIETITIAN INITIAL EVALUATION ADULT. - OTHER INFO
61 year old female with history of morbid obesity ( BMI 41) , DM II, HTN, HLD , CYRUS s/p robotic assisted RnYGB 9/27.\    On assessment, pt resting in bed. Utilized  via [email or web address] to communicate with pt. Currently on BARICLLIQ diet, tolerating PO. Reports drinking 1/2 cup of fluids this AM. Pt Pain and nausea well controlled. Discussed volumes of various cup sizes on tray table and encouraged aiming for 4 oz/hr as tolerated. Prepared with protein shakes and vitamins to take after discharge. RD provided indepth edu on diet advancement and specific nutrient needs s/p RnYGB. NKFA. No dietary restrictions at home. Skin: surgical incisions. GI: WDL per flowsheet. RD to follow up per protocol.

## 2021-09-29 NOTE — DISCHARGE NOTE PROVIDER - NSDCFUADDINST_GEN_ALL_CORE_FT
Follow up with  in 1 week. Call the office at  to schedule your appointment. You may shower; soap and water over incision sites. Do not scrub. Pat dry when done. No tub bathing or swimming until cleared. Keep incision sites out of the sun as scars will darken. No heavy lifting (>10lbs) or strenuous exercise. Diet: Bariatric Full Fluids. 60 grams protein daily.  64 fluid ounces water daily. Drink small sips throughout the day. Continue diet as outlined by paperwork received as a pre-operative patient. You should be urinating at least 3-4x per day. Call the office if you experience increasing abdominal pain, nausea, vomiting, or temperature >100.4F.  NO ASPIRIN OR NSAIDs until approved by Dr. Bob. Avoid alcoholic beverages until cleared by Dr. Bob.  1) Please take Tylenol 650mg every 4 to 6 hours as needed for moderate to severe pain control. Please do not exceed 4,000 mg of Tylenol a day.  2) Please take Protonix 40 mg by mouth daily.       Follow up with  in 1 week. Call the office at  to schedule your appointment. You may shower; soap and water over incision sites. Do not scrub. Pat dry when done. No tub bathing or swimming until cleared. Keep incision sites out of the sun as scars will darken. No heavy lifting (>10lbs) or strenuous exercise. Diet: Bariatric Full Fluids. 60 grams protein daily.  64 fluid ounces water daily. Drink small sips throughout the day. Continue diet as outlined by paperwork received as a pre-operative patient. You should be urinating at least 3-4x per day. Call the office if you experience increasing abdominal pain, nausea, vomiting, or temperature >100.4F.  NO ASPIRIN OR NSAIDs until approved by Dr. Bob. Avoid alcoholic beverages until cleared by Dr. Bob.  1) Please take Tylenol 650mg every 4 to 6 hours as needed for moderate to severe pain control. Please do not exceed 4,000 mg of Tylenol a day.  2) Please take Protonix 40 mg by mouth daily.   3) PLEASE FOLLOW UP WITH YOUR CARDIOLOGIST AS SOON AS POSSIBLE TO ADJUST BLOOD PRESSURE MEDICATION.

## 2021-09-29 NOTE — DIETITIAN INITIAL EVALUATION ADULT. - OTHER CALCULATIONS
IBW used to calculate energy needs due to pt's current body weight exceeding 120% of IBW (208%). Above energy needs calculated for wt maintenance (20-25kcal/kg).   Weeks 1-2 estimated needs: kcal/day (10-12kcal/kg), g pro/day (1.5-2.1g/kg), >/=64oz clear fluids.

## 2021-10-05 ENCOUNTER — APPOINTMENT (OUTPATIENT)
Dept: BARIATRICS | Facility: CLINIC | Age: 61
End: 2021-10-05
Payer: MEDICAID

## 2021-10-05 VITALS
OXYGEN SATURATION: 97 % | SYSTOLIC BLOOD PRESSURE: 127 MMHG | TEMPERATURE: 96.8 F | HEART RATE: 72 BPM | WEIGHT: 214.25 LBS | DIASTOLIC BLOOD PRESSURE: 81 MMHG | BODY MASS INDEX: 40.45 KG/M2 | HEIGHT: 61 IN

## 2021-10-05 PROBLEM — G47.33 OBSTRUCTIVE SLEEP APNEA (ADULT) (PEDIATRIC): Chronic | Status: ACTIVE | Noted: 2021-09-27

## 2021-10-05 PROBLEM — E11.9 TYPE 2 DIABETES MELLITUS WITHOUT COMPLICATIONS: Chronic | Status: ACTIVE | Noted: 2021-09-24

## 2021-10-05 PROBLEM — I10 ESSENTIAL (PRIMARY) HYPERTENSION: Chronic | Status: ACTIVE | Noted: 2021-09-24

## 2021-10-05 PROBLEM — M19.90 UNSPECIFIED OSTEOARTHRITIS, UNSPECIFIED SITE: Chronic | Status: ACTIVE | Noted: 2021-09-27

## 2021-10-05 PROBLEM — E78.5 HYPERLIPIDEMIA, UNSPECIFIED: Chronic | Status: ACTIVE | Noted: 2021-09-24

## 2021-10-05 PROBLEM — E66.9 OBESITY, UNSPECIFIED: Chronic | Status: ACTIVE | Noted: 2021-09-24

## 2021-10-05 PROCEDURE — 99024 POSTOP FOLLOW-UP VISIT: CPT

## 2021-10-08 DIAGNOSIS — E83.39 OTHER DISORDERS OF PHOSPHORUS METABOLISM: ICD-10-CM

## 2021-10-08 DIAGNOSIS — Z79.84 LONG TERM (CURRENT) USE OF ORAL HYPOGLYCEMIC DRUGS: ICD-10-CM

## 2021-10-08 DIAGNOSIS — K21.9 GASTRO-ESOPHAGEAL REFLUX DISEASE WITHOUT ESOPHAGITIS: ICD-10-CM

## 2021-10-08 DIAGNOSIS — G47.33 OBSTRUCTIVE SLEEP APNEA (ADULT) (PEDIATRIC): ICD-10-CM

## 2021-10-08 DIAGNOSIS — E11.9 TYPE 2 DIABETES MELLITUS WITHOUT COMPLICATIONS: ICD-10-CM

## 2021-10-08 DIAGNOSIS — K59.09 OTHER CONSTIPATION: ICD-10-CM

## 2021-10-08 DIAGNOSIS — Z90.710 ACQUIRED ABSENCE OF BOTH CERVIX AND UTERUS: ICD-10-CM

## 2021-10-08 DIAGNOSIS — E66.01 MORBID (SEVERE) OBESITY DUE TO EXCESS CALORIES: ICD-10-CM

## 2021-10-08 DIAGNOSIS — I10 ESSENTIAL (PRIMARY) HYPERTENSION: ICD-10-CM

## 2021-10-08 DIAGNOSIS — E78.5 HYPERLIPIDEMIA, UNSPECIFIED: ICD-10-CM

## 2021-10-18 ENCOUNTER — NON-APPOINTMENT (OUTPATIENT)
Age: 61
End: 2021-10-18

## 2021-10-26 ENCOUNTER — APPOINTMENT (OUTPATIENT)
Dept: SURGERY | Facility: CLINIC | Age: 61
End: 2021-10-26
Payer: MEDICAID

## 2021-10-26 VITALS
DIASTOLIC BLOOD PRESSURE: 84 MMHG | SYSTOLIC BLOOD PRESSURE: 135 MMHG | HEART RATE: 74 BPM | OXYGEN SATURATION: 96 % | TEMPERATURE: 96.5 F | WEIGHT: 206 LBS | BODY MASS INDEX: 38.89 KG/M2 | HEIGHT: 61 IN

## 2021-10-26 DIAGNOSIS — E66.01 MORBID (SEVERE) OBESITY DUE TO EXCESS CALORIES: ICD-10-CM

## 2021-10-26 DIAGNOSIS — I10 ESSENTIAL (PRIMARY) HYPERTENSION: ICD-10-CM

## 2021-10-26 DIAGNOSIS — E78.00 PURE HYPERCHOLESTEROLEMIA, UNSPECIFIED: ICD-10-CM

## 2021-10-26 DIAGNOSIS — E11.37X3 TYPE 2 DIABETES MELLITUS WITH DIABETIC MACULAR EDEMA, RESOLVED FOLLOWING TREATMENT, BILATERAL: ICD-10-CM

## 2021-10-26 DIAGNOSIS — E11.9 TYPE 2 DIABETES MELLITUS W/OUT COMPLICATIONS: ICD-10-CM

## 2021-10-26 PROCEDURE — 99024 POSTOP FOLLOW-UP VISIT: CPT

## 2021-10-26 NOTE — HISTORY OF PRESENT ILLNESS
[de-identified] : Pt is a 60 y/o F who presents today for post op 1 month s/p VSG 9/27/21. She reports abdominal pain and inability to tolerate anything other than clear liquids. Pt reports foaming and frequent reflux, regurgitation and vomiting after eating foods such as eggs and tuna. States she can tolerate clear liquids, broths, protein shakes and yogurt but has abdominal pain with eating. Pt is not taking PPIs currently. \par

## 2021-10-26 NOTE — ASSESSMENT
[FreeTextEntry1] : Pt is a 60 y/o F who presents today for post op 1 month s/p VSG 9/27/21. Pt presents with frequent abdominal pain and foaming as well as regurgitation and vomiting after oral intake. Will order EGD for further investigation of symptoms and possible stricture. Pt will follow up after EGD.\par \par

## 2021-10-26 NOTE — END OF VISIT
[FreeTextEntry3] : All medical record entries made by the Scribe were at my, Dr. Bob's, discretion and personally dictated by me on 10/26/2021. I have reviewed the chart and agree that the record accurately reflects my personal performance of the history, physical exam, assessment and plan. I have also personally directed, reviewed and agreed to the chart.

## 2021-10-28 ENCOUNTER — EMERGENCY (EMERGENCY)
Facility: HOSPITAL | Age: 61
LOS: 1 days | Discharge: HOME | End: 2021-10-28
Attending: EMERGENCY MEDICINE | Admitting: EMERGENCY MEDICINE
Payer: MEDICAID

## 2021-10-28 VITALS
OXYGEN SATURATION: 99 % | HEIGHT: 61.02 IN | RESPIRATION RATE: 18 BRPM | HEART RATE: 66 BPM | SYSTOLIC BLOOD PRESSURE: 149 MMHG | WEIGHT: 199.96 LBS | TEMPERATURE: 98 F | DIASTOLIC BLOOD PRESSURE: 90 MMHG

## 2021-10-28 VITALS
SYSTOLIC BLOOD PRESSURE: 131 MMHG | DIASTOLIC BLOOD PRESSURE: 78 MMHG | TEMPERATURE: 99 F | OXYGEN SATURATION: 97 % | HEART RATE: 70 BPM | RESPIRATION RATE: 17 BRPM

## 2021-10-28 DIAGNOSIS — Z90.89 ACQUIRED ABSENCE OF OTHER ORGANS: Chronic | ICD-10-CM

## 2021-10-28 DIAGNOSIS — Z20.822 CONTACT WITH AND (SUSPECTED) EXPOSURE TO COVID-19: ICD-10-CM

## 2021-10-28 DIAGNOSIS — I10 ESSENTIAL (PRIMARY) HYPERTENSION: ICD-10-CM

## 2021-10-28 DIAGNOSIS — M19.90 UNSPECIFIED OSTEOARTHRITIS, UNSPECIFIED SITE: ICD-10-CM

## 2021-10-28 DIAGNOSIS — G47.33 OBSTRUCTIVE SLEEP APNEA (ADULT) (PEDIATRIC): ICD-10-CM

## 2021-10-28 DIAGNOSIS — Z98.890 OTHER SPECIFIED POSTPROCEDURAL STATES: Chronic | ICD-10-CM

## 2021-10-28 DIAGNOSIS — E11.9 TYPE 2 DIABETES MELLITUS WITHOUT COMPLICATIONS: ICD-10-CM

## 2021-10-28 DIAGNOSIS — R10.13 EPIGASTRIC PAIN: ICD-10-CM

## 2021-10-28 DIAGNOSIS — R00.1 BRADYCARDIA, UNSPECIFIED: ICD-10-CM

## 2021-10-28 DIAGNOSIS — E78.5 HYPERLIPIDEMIA, UNSPECIFIED: ICD-10-CM

## 2021-10-28 DIAGNOSIS — R10.12 LEFT UPPER QUADRANT PAIN: ICD-10-CM

## 2021-10-28 DIAGNOSIS — Z98.84 BARIATRIC SURGERY STATUS: Chronic | ICD-10-CM

## 2021-10-28 DIAGNOSIS — Z90.710 ACQUIRED ABSENCE OF BOTH CERVIX AND UTERUS: Chronic | ICD-10-CM

## 2021-10-28 DIAGNOSIS — R11.10 VOMITING, UNSPECIFIED: ICD-10-CM

## 2021-10-28 DIAGNOSIS — R10.9 UNSPECIFIED ABDOMINAL PAIN: ICD-10-CM

## 2021-10-28 LAB
ALBUMIN SERPL ELPH-MCNC: 4.6 G/DL — SIGNIFICANT CHANGE UP (ref 3.3–5)
ALP SERPL-CCNC: 95 U/L — SIGNIFICANT CHANGE UP (ref 40–120)
ALT FLD-CCNC: 67 U/L — HIGH (ref 10–45)
ANION GAP SERPL CALC-SCNC: 14 MMOL/L — SIGNIFICANT CHANGE UP (ref 5–17)
APPEARANCE UR: CLEAR — SIGNIFICANT CHANGE UP
APTT BLD: 32 SEC — SIGNIFICANT CHANGE UP (ref 27.5–35.5)
AST SERPL-CCNC: 34 U/L — SIGNIFICANT CHANGE UP (ref 10–40)
BASOPHILS # BLD AUTO: 0.09 K/UL — SIGNIFICANT CHANGE UP (ref 0–0.2)
BASOPHILS NFR BLD AUTO: 1.3 % — SIGNIFICANT CHANGE UP (ref 0–2)
BILIRUB SERPL-MCNC: 0.6 MG/DL — SIGNIFICANT CHANGE UP (ref 0.2–1.2)
BILIRUB UR-MCNC: NEGATIVE — SIGNIFICANT CHANGE UP
BUN SERPL-MCNC: 9 MG/DL — SIGNIFICANT CHANGE UP (ref 7–23)
CALCIUM SERPL-MCNC: 9.9 MG/DL — SIGNIFICANT CHANGE UP (ref 8.4–10.5)
CHLORIDE SERPL-SCNC: 105 MMOL/L — SIGNIFICANT CHANGE UP (ref 96–108)
CO2 SERPL-SCNC: 25 MMOL/L — SIGNIFICANT CHANGE UP (ref 22–31)
COLOR SPEC: YELLOW — SIGNIFICANT CHANGE UP
CREAT SERPL-MCNC: 0.75 MG/DL — SIGNIFICANT CHANGE UP (ref 0.5–1.3)
DIFF PNL FLD: NEGATIVE — SIGNIFICANT CHANGE UP
EOSINOPHIL # BLD AUTO: 0.08 K/UL — SIGNIFICANT CHANGE UP (ref 0–0.5)
EOSINOPHIL NFR BLD AUTO: 1.2 % — SIGNIFICANT CHANGE UP (ref 0–6)
GLUCOSE SERPL-MCNC: 111 MG/DL — HIGH (ref 70–99)
GLUCOSE UR QL: NEGATIVE — SIGNIFICANT CHANGE UP
HCT VFR BLD CALC: 40.7 % — SIGNIFICANT CHANGE UP (ref 34.5–45)
HGB BLD-MCNC: 13.6 G/DL — SIGNIFICANT CHANGE UP (ref 11.5–15.5)
IMM GRANULOCYTES NFR BLD AUTO: 0.1 % — SIGNIFICANT CHANGE UP (ref 0–1.5)
INR BLD: 1.15 — SIGNIFICANT CHANGE UP (ref 0.88–1.16)
KETONES UR-MCNC: 40 MG/DL
LACTATE SERPL-SCNC: 1.7 MMOL/L — SIGNIFICANT CHANGE UP (ref 0.5–2)
LEUKOCYTE ESTERASE UR-ACNC: NEGATIVE — SIGNIFICANT CHANGE UP
LIDOCAIN IGE QN: 54 U/L — SIGNIFICANT CHANGE UP (ref 7–60)
LYMPHOCYTES # BLD AUTO: 3.04 K/UL — SIGNIFICANT CHANGE UP (ref 1–3.3)
LYMPHOCYTES # BLD AUTO: 44.6 % — HIGH (ref 13–44)
MAGNESIUM SERPL-MCNC: 1.9 MG/DL — SIGNIFICANT CHANGE UP (ref 1.6–2.6)
MCHC RBC-ENTMCNC: 32.5 PG — SIGNIFICANT CHANGE UP (ref 27–34)
MCHC RBC-ENTMCNC: 33.4 GM/DL — SIGNIFICANT CHANGE UP (ref 32–36)
MCV RBC AUTO: 97.4 FL — SIGNIFICANT CHANGE UP (ref 80–100)
MONOCYTES # BLD AUTO: 0.66 K/UL — SIGNIFICANT CHANGE UP (ref 0–0.9)
MONOCYTES NFR BLD AUTO: 9.7 % — SIGNIFICANT CHANGE UP (ref 2–14)
NEUTROPHILS # BLD AUTO: 2.93 K/UL — SIGNIFICANT CHANGE UP (ref 1.8–7.4)
NEUTROPHILS NFR BLD AUTO: 43.1 % — SIGNIFICANT CHANGE UP (ref 43–77)
NITRITE UR-MCNC: NEGATIVE — SIGNIFICANT CHANGE UP
NRBC # BLD: 0 /100 WBCS — SIGNIFICANT CHANGE UP (ref 0–0)
PH UR: 6 — SIGNIFICANT CHANGE UP (ref 5–8)
PHOSPHATE SERPL-MCNC: 3.6 MG/DL — SIGNIFICANT CHANGE UP (ref 2.5–4.5)
PLATELET # BLD AUTO: 301 K/UL — SIGNIFICANT CHANGE UP (ref 150–400)
POTASSIUM SERPL-MCNC: 3.5 MMOL/L — SIGNIFICANT CHANGE UP (ref 3.5–5.3)
POTASSIUM SERPL-SCNC: 3.5 MMOL/L — SIGNIFICANT CHANGE UP (ref 3.5–5.3)
PROT SERPL-MCNC: 7.6 G/DL — SIGNIFICANT CHANGE UP (ref 6–8.3)
PROT UR-MCNC: NEGATIVE MG/DL — SIGNIFICANT CHANGE UP
PROTHROM AB SERPL-ACNC: 13.7 SEC — HIGH (ref 10.6–13.6)
RBC # BLD: 4.18 M/UL — SIGNIFICANT CHANGE UP (ref 3.8–5.2)
RBC # FLD: 13.5 % — SIGNIFICANT CHANGE UP (ref 10.3–14.5)
SARS-COV-2 RNA SPEC QL NAA+PROBE: NEGATIVE — SIGNIFICANT CHANGE UP
SODIUM SERPL-SCNC: 144 MMOL/L — SIGNIFICANT CHANGE UP (ref 135–145)
SP GR SPEC: 1.02 — SIGNIFICANT CHANGE UP (ref 1–1.03)
UROBILINOGEN FLD QL: 0.2 E.U./DL — SIGNIFICANT CHANGE UP
WBC # BLD: 6.81 K/UL — SIGNIFICANT CHANGE UP (ref 3.8–10.5)
WBC # FLD AUTO: 6.81 K/UL — SIGNIFICANT CHANGE UP (ref 3.8–10.5)

## 2021-10-28 PROCEDURE — 81003 URINALYSIS AUTO W/O SCOPE: CPT

## 2021-10-28 PROCEDURE — 96375 TX/PRO/DX INJ NEW DRUG ADDON: CPT

## 2021-10-28 PROCEDURE — 99285 EMERGENCY DEPT VISIT HI MDM: CPT | Mod: 25

## 2021-10-28 PROCEDURE — 99284 EMERGENCY DEPT VISIT MOD MDM: CPT | Mod: 25

## 2021-10-28 PROCEDURE — 83605 ASSAY OF LACTIC ACID: CPT

## 2021-10-28 PROCEDURE — 36415 COLL VENOUS BLD VENIPUNCTURE: CPT

## 2021-10-28 PROCEDURE — 84100 ASSAY OF PHOSPHORUS: CPT

## 2021-10-28 PROCEDURE — 80053 COMPREHEN METABOLIC PANEL: CPT

## 2021-10-28 PROCEDURE — 83690 ASSAY OF LIPASE: CPT

## 2021-10-28 PROCEDURE — 85025 COMPLETE CBC W/AUTO DIFF WBC: CPT

## 2021-10-28 PROCEDURE — 93010 ELECTROCARDIOGRAM REPORT: CPT

## 2021-10-28 PROCEDURE — 85730 THROMBOPLASTIN TIME PARTIAL: CPT

## 2021-10-28 PROCEDURE — 93005 ELECTROCARDIOGRAM TRACING: CPT

## 2021-10-28 PROCEDURE — 83735 ASSAY OF MAGNESIUM: CPT

## 2021-10-28 PROCEDURE — 87635 SARS-COV-2 COVID-19 AMP PRB: CPT

## 2021-10-28 PROCEDURE — 96374 THER/PROPH/DIAG INJ IV PUSH: CPT

## 2021-10-28 PROCEDURE — 85610 PROTHROMBIN TIME: CPT

## 2021-10-28 RX ORDER — SODIUM CHLORIDE 9 MG/ML
1000 INJECTION INTRAMUSCULAR; INTRAVENOUS; SUBCUTANEOUS ONCE
Refills: 0 | Status: COMPLETED | OUTPATIENT
Start: 2021-10-28 | End: 2021-10-28

## 2021-10-28 RX ORDER — PANTOPRAZOLE SODIUM 20 MG/1
40 TABLET, DELAYED RELEASE ORAL ONCE
Refills: 0 | Status: COMPLETED | OUTPATIENT
Start: 2021-10-28 | End: 2021-10-28

## 2021-10-28 RX ORDER — FOLIC ACID 0.8 MG
1 TABLET ORAL DAILY
Refills: 0 | Status: DISCONTINUED | OUTPATIENT
Start: 2021-10-28 | End: 2021-10-31

## 2021-10-28 RX ORDER — ONDANSETRON 8 MG/1
4 TABLET, FILM COATED ORAL ONCE
Refills: 0 | Status: COMPLETED | OUTPATIENT
Start: 2021-10-28 | End: 2021-10-28

## 2021-10-28 RX ORDER — LIDOCAINE 4 G/100G
10 CREAM TOPICAL ONCE
Refills: 0 | Status: COMPLETED | OUTPATIENT
Start: 2021-10-28 | End: 2021-10-28

## 2021-10-28 RX ORDER — DEXAMETHASONE 0.5 MG/5ML
6 ELIXIR ORAL ONCE
Refills: 0 | Status: COMPLETED | OUTPATIENT
Start: 2021-10-28 | End: 2021-10-28

## 2021-10-28 RX ORDER — SODIUM CHLORIDE 9 MG/ML
1000 INJECTION INTRAMUSCULAR; INTRAVENOUS; SUBCUTANEOUS
Refills: 0 | Status: DISCONTINUED | OUTPATIENT
Start: 2021-10-28 | End: 2021-10-31

## 2021-10-28 RX ORDER — THIAMINE MONONITRATE (VIT B1) 100 MG
100 TABLET ORAL ONCE
Refills: 0 | Status: COMPLETED | OUTPATIENT
Start: 2021-10-28 | End: 2021-10-28

## 2021-10-28 RX ADMIN — LIDOCAINE 10 MILLILITER(S): 4 CREAM TOPICAL at 10:36

## 2021-10-28 RX ADMIN — Medication 1 TABLET(S): at 13:04

## 2021-10-28 RX ADMIN — PANTOPRAZOLE SODIUM 40 MILLIGRAM(S): 20 TABLET, DELAYED RELEASE ORAL at 09:44

## 2021-10-28 RX ADMIN — ONDANSETRON 4 MILLIGRAM(S): 8 TABLET, FILM COATED ORAL at 09:43

## 2021-10-28 RX ADMIN — Medication 100 MILLIGRAM(S): at 13:02

## 2021-10-28 RX ADMIN — Medication 30 MILLILITER(S): at 10:36

## 2021-10-28 RX ADMIN — SODIUM CHLORIDE 1000 MILLILITER(S): 9 INJECTION INTRAMUSCULAR; INTRAVENOUS; SUBCUTANEOUS at 10:07

## 2021-10-28 RX ADMIN — Medication 1 MILLIGRAM(S): at 13:04

## 2021-10-28 RX ADMIN — SODIUM CHLORIDE 1000 MILLILITER(S): 9 INJECTION INTRAMUSCULAR; INTRAVENOUS; SUBCUTANEOUS at 09:42

## 2021-10-28 RX ADMIN — Medication 6 MILLIGRAM(S): at 13:04

## 2021-10-28 NOTE — CONSULT NOTE ADULT - SUBJECTIVE AND OBJECTIVE BOX
General Surgery Consult Note:    HPI: 61 year old female with history of morbid obesity ( BMI 41) , DM II, HTN, HLD , CYRUS s/p robotic assisted RnYGB  now presenting with PO intolerance x 2 weeks. Pt reports that since the time of discharge on  she was tolerating bariatric clear liquid diet well, however, once it was time to advance to a bariatric blended diet 2 weeks ago she reported epigastric burning, nausea and emesis. Pt saw Dr. Orlando on 10/26, pt was given protonix, told to go back to clear liquid diet and scheduled to have an endoscopy done this upcoming Monday on . Since her appointment shes reports that the nasuea and emesis has worsened and hasnt been able to keep down any of the clear liquids and reports weakness and decreased urinary output. Pt reports having 2 BMs since surgery, last one was 4 days ago and is currently passing gas. Pt denies fever, chills, CP, SOB, urinary burning, hematemesis       Physical Exam:  General: NAD, resting comfortably  Pulmonary: normal resp effort   Cardiovascular: NSR   Abdominal: soft, nondistneded, tender in the epigastrium  Extremities: WWP,     Vital Signs Last 24 Hrs  T(C): 36.8 (28 Oct 2021 08:41), Max: 36.8 (28 Oct 2021 08:41)  T(F): 98.2 (28 Oct 2021 08:41), Max: 98.2 (28 Oct 2021 08:41)  HR: 66 (28 Oct 2021 08:41) (66 - 66)  BP: 149/90 (28 Oct 2021 08:41) (149/90 - 149/90)  RR: 18 (28 Oct 2021 08:41) (18 - 18)  SpO2: 99% (28 Oct 2021 08:41) (99% - 99%)    I&O's Summary          LABS:                        13.6   6.81  )-----------( 301      ( 28 Oct 2021 10:44 )             40.7     10-28    144  |  105  |  9   ----------------------------<  111<H>  3.5   |  25  |  0.75    Ca    9.9      28 Oct 2021 10:44  Phos  3.6     10-28  Mg     1.9     10-28    TPro  7.6  /  Alb  4.6  /  TBili  0.6  /  DBili  x   /  AST  34  /  ALT  67<H>  /  AlkPhos  95  10-28    PT/INR - ( 28 Oct 2021 10:45 )   PT: 13.7 sec;   INR: 1.15        PTT - ( 28 Oct 2021 10:45 )  PTT:32.0 sec      Urinalysis Basic - ( 28 Oct 2021 11:42 )  Color: Yellow / Appearance: Clear / S.020 / pH: x  Gluc: x / Ketone: 40 mg/dL  / Bili: Negative / Urobili: 0.2 E.U./dL   Blood: x / Protein: NEGATIVE mg/dL / Nitrite: NEGATIVE   Leuk Esterase: NEGATIVE / RBC: x / WBC x   Sq Epi: x / Non Sq Epi: x / Bacteria: x    LIVER FUNCTIONS - ( 28 Oct 2021 10:44 )  Alb: 4.6 g/dL / Pro: 7.6 g/dL / ALK PHOS: 95 U/L / ALT: 67 U/L / AST: 34 U/L / GGT: x

## 2021-10-28 NOTE — ED PROVIDER NOTE - PROGRESS NOTE DETAILS
decadron given for edema at anastamosis site,  pt tolerating PO and nausea resolved, stable for d/c   f/u for endoscopy   cleared by surger for d/c

## 2021-10-28 NOTE — ED PROVIDER NOTE - NSICDXPASTMEDICALHX_GEN_ALL_CORE_FT
PAST MEDICAL HISTORY:  Arthritis     Diabetes mellitus     HLD (hyperlipidemia)     HTN (hypertension)     Obesity     CYRUS on CPAP

## 2021-10-28 NOTE — ED ADULT NURSE NOTE - OBJECTIVE STATEMENT
60yo female post Gastric Bypass Surgery, states she been vomiting for the last 3 days and unable to urinate. Surgeon saw PT 2 days prior with endoscopy scheduled for Monday 11/1. Daughter bought her in because she continues to be unable to hold down anything liquid or medications. Pain is noted to upper abdominal area with burning sensation noted.

## 2021-10-28 NOTE — ED PROVIDER NOTE - CONSTITUTIONAL, MLM
Obese female, awake, alert, oriented to person, place, time/situation and in no apparent distress. normal...

## 2021-10-28 NOTE — ED ADULT TRIAGE NOTE - CHIEF COMPLAINT QUOTE
Pt 1 month s/p gastric bipass w/ MD Manzano presents reporting 15 days of worsening vomiting. Pt unable to tolerate clear fluids since tuesday, reports lightheadedness. Pt seen by MD Manzano 10/26, scheduled for endoscopy 11/1. Pt 1 month s/p gastric bipass w/ MD Manzano presents reporting 15 days of worsening vomiting. Pt unable to tolerate clear fluids since tuesday, reports lightheadedness, reports decreasing urine output. Pt seen by MD Manzano 10/26, scheduled for endoscopy 11/1.

## 2021-10-28 NOTE — ED ADULT NURSE NOTE - NSIMPLEMENTINTERV_GEN_ALL_ED
Implemented All Fall with Harm Risk Interventions:  [place] to call system. Call bell, personal items and telephone within reach. Instruct patient to call for assistance. Room bathroom lighting operational. Non-slip footwear when patient is off stretcher. Physically safe environment: no spills, clutter or unnecessary equipment. Stretcher in lowest position, wheels locked, appropriate side rails in place. Provide visual cue, wrist band, yellow gown, etc. Monitor gait and stability. Monitor for mental status changes and reorient to person, place, and time. Review medications for side effects contributing to fall risk. Reinforce activity limits and safety measures with patient and family. Provide visual clues: red socks.

## 2021-10-28 NOTE — ED PROVIDER NOTE - CLINICAL SUMMARY MEDICAL DECISION MAKING FREE TEXT BOX
62 y/o F with burning LUQ and epigastric discomfort since recent gastric bypass with difficulty tolerating PO. Pt most likely with gastritis and dehydration. Will assess for metabolic abnormalities and pancreatitis. Administering fluids, GI cocktail, Protonix. If pain is persistent tor labs are concerning, will consider CT imaging to r/o other post-op complications. Surgery consulted and will see Pt within next hour for further input.

## 2021-10-28 NOTE — ED PROVIDER NOTE - PATIENT PORTAL LINK FT
You can access the FollowMyHealth Patient Portal offered by [facility] by registering at the following website: [email or web address]. By joining [email or web address]’s FollowMyHealth portal, you will also be able to view your health information using other applications (apps) compatible with our system.

## 2021-10-28 NOTE — ED ADULT NURSE NOTE - CHIEF COMPLAINT QUOTE
Pt 1 month s/p gastric bipass w/ MD Manzano presents reporting 15 days of worsening vomiting. Pt unable to tolerate clear fluids since tuesday, reports lightheadedness, reports decreasing urine output. Pt seen by MD Manzano 10/26, scheduled for endoscopy 11/1.

## 2021-10-28 NOTE — ED PROVIDER NOTE - NSICDXPASTSURGICALHX_GEN_ALL_CORE_FT
PAST SURGICAL HISTORY:  H/O bladder repair surgery     H/O: hysterectomy     History of tonsillectomy       S/P gastric bypass

## 2021-10-28 NOTE — ED PROVIDER NOTE - OBJECTIVE STATEMENT
62 y/o F PMHx DM, HLD, HTN, obesity, s/p gastric bypass surgery on 9/27/21 by Dr. Bob. Pt reports that for the first 2 weeks after the surgery, she tolerated a liquid diet. When she was advanced to blended food 2 weeks ago, she began to have frequent vomiting and nausea after eating. Denies severe pain but endorses epigastric and left upper abdominal burning. Reports feeling weak, lightheaded, and dehydrated. She saw her surgeon 2 days ago and was put on Protonix with no relief. Reports feeling slightly worse sense then. Pt has an endoscopy arranged for 11/1/21. Denies fevers, chest pain, SOb, and diarrhea. 2 normal BM since surgery, last 1 week ago. Denies black or bloody stools. No mid or lower abdominal pain. Pain is constant with no radiation.

## 2021-10-28 NOTE — ED PROVIDER NOTE - NSFOLLOWUPINSTRUCTIONS_ED_ALL_ED_FT
Follow up with your endoscopy appointment     Abdominal Pain    Many things can cause abdominal pain. Many times, abdominal pain is not caused by a disease and will improve without treatment. Your health care provider will do a physical exam to determine if there is a dangerous cause of your pain; blood tests and imaging may help determine the cause of your pain. However, in many cases, no cause may be found and you may need further testing as an outpatient. Monitor your abdominal pain for any changes.     SEEK IMMEDIATE MEDICAL CARE IF YOU HAVE ANY OF THE FOLLOWING SYMPTOMS: worsening abdominal pain, uncontrollable vomiting, profuse diarrhea, inability to have bowel movements or pass gas, black or bloody stools, fever accompanying chest pain or back pain, or fainting. These symptoms may represent a serious problem that is an emergency. Do not wait to see if the symptoms will go away. Get medical help right away. Call 911 and do not drive yourself to the hospital.

## 2021-10-28 NOTE — CONSULT NOTE ADULT - ASSESSMENT
61 year old female with history of morbid obesity ( BMI 41) , DM II, HTN, HLD , CYRUS s/p robotic assisted RnYGB 9/27 now presenting with nausea/vomiting x 2 weeks.    Labs appreciated, all adequate    IVF resuscitation  IV thiamine  PO challenge  Case discussed with chief resident and attending 61 year old female with history of morbid obesity ( BMI 41) , DM II, HTN, HLD , CYRUS s/p robotic assisted RnYGB 9/27 now presenting with nausea/vomiting x 2 weeks.    Labs appreciated, all adequate    IVF resuscitation  Banana bag: IV thiamine, IV folic acid, IV MVI  IV Decadron 6mg  PO challenge  Case discussed with chief resident and attending

## 2021-11-01 ENCOUNTER — OUTPATIENT (OUTPATIENT)
Dept: OUTPATIENT SERVICES | Facility: HOSPITAL | Age: 61
LOS: 1 days | Discharge: HOME | End: 2021-11-01
Payer: MEDICAID

## 2021-11-01 ENCOUNTER — TRANSCRIPTION ENCOUNTER (OUTPATIENT)
Age: 61
End: 2021-11-01

## 2021-11-01 ENCOUNTER — APPOINTMENT (OUTPATIENT)
Dept: BARIATRICS | Facility: HOSPITAL | Age: 61
End: 2021-11-01
Payer: MEDICAID

## 2021-11-01 VITALS
RESPIRATION RATE: 20 BRPM | DIASTOLIC BLOOD PRESSURE: 82 MMHG | SYSTOLIC BLOOD PRESSURE: 126 MMHG | HEART RATE: 72 BPM | OXYGEN SATURATION: 95 %

## 2021-11-01 VITALS
OXYGEN SATURATION: 96 % | WEIGHT: 202.83 LBS | HEART RATE: 91 BPM | TEMPERATURE: 98 F | RESPIRATION RATE: 16 BRPM | HEIGHT: 60 IN

## 2021-11-01 DIAGNOSIS — Z90.710 ACQUIRED ABSENCE OF BOTH CERVIX AND UTERUS: Chronic | ICD-10-CM

## 2021-11-01 DIAGNOSIS — Z90.89 ACQUIRED ABSENCE OF OTHER ORGANS: Chronic | ICD-10-CM

## 2021-11-01 DIAGNOSIS — Z98.890 OTHER SPECIFIED POSTPROCEDURAL STATES: Chronic | ICD-10-CM

## 2021-11-01 DIAGNOSIS — Z98.84 BARIATRIC SURGERY STATUS: Chronic | ICD-10-CM

## 2021-11-01 LAB — GLUCOSE BLDC GLUCOMTR-MCNC: 116 MG/DL — HIGH (ref 70–99)

## 2021-11-01 PROCEDURE — 43245 EGD DILATE STRICTURE: CPT

## 2021-11-01 PROCEDURE — 82962 GLUCOSE BLOOD TEST: CPT

## 2021-11-01 PROCEDURE — 43249 ESOPH EGD DILATION <30 MM: CPT | Mod: 78,GC

## 2021-11-01 PROCEDURE — C1726: CPT

## 2021-11-01 NOTE — DISCHARGE NOTE NURSING/CASE MANAGEMENT/SOCIAL WORK - PATIENT PORTAL LINK FT
You can access the FollowMyHealth Patient Portal offered by [facility] by registering at the following website: [email or web address]. By joining [employer]’s FollowMyHealth portal, you will also be able to view your health information using other applications (apps) compatible with our system.

## 2021-11-01 NOTE — PROCEDURE NOTE - ADDITIONAL PROCEDURE DETAILS
EGD was done. Anastomotic stenosis was noted. Balloon dilation was done using 6,7,8 balloon then 8,9,10 and then 10,11, 12 successfully. The patient was taken to the PACU afterward. No blood loss.

## 2021-11-01 NOTE — ASU PATIENT PROFILE, ADULT - ALCOHOL USE HISTORY SINGLE SELECT
never Dermal Autograft Text: The defect edges were debeveled with a #15 scalpel blade.  Given the location of the defect, shape of the defect and the proximity to free margins a dermal autograft was deemed most appropriate.  Using a sterile surgical marker, the primary defect shape was transferred to the donor site. The area thus outlined was incised deep to adipose tissue with a #15 scalpel blade.  The harvested graft was then trimmed of adipose and epidermal tissue until only dermis was left.  The skin graft was then placed in the primary defect and oriented appropriately.

## 2021-11-16 ENCOUNTER — LABORATORY RESULT (OUTPATIENT)
Age: 61
End: 2021-11-16

## 2021-11-16 ENCOUNTER — APPOINTMENT (OUTPATIENT)
Dept: BARIATRICS | Facility: CLINIC | Age: 61
End: 2021-11-16
Payer: MEDICAID

## 2021-11-16 VITALS
SYSTOLIC BLOOD PRESSURE: 123 MMHG | TEMPERATURE: 97.4 F | HEIGHT: 61 IN | OXYGEN SATURATION: 98 % | HEART RATE: 66 BPM | DIASTOLIC BLOOD PRESSURE: 82 MMHG | BODY MASS INDEX: 37.83 KG/M2 | WEIGHT: 200.38 LBS

## 2021-11-16 PROCEDURE — 99024 POSTOP FOLLOW-UP VISIT: CPT

## 2021-11-16 NOTE — HISTORY OF PRESENT ILLNESS
[de-identified] : Pt is a 62 y/o [place]ian-speaking F s/p RYGB 9/27/21 then EGD with balloon dilation 11/1/21 for stricture who presents today for follow up. She is doing generally well but continues to complain of abdominal pain after eating. States she can consume protein shakes and thin mashed potatoes but has pain after eating with everything else.\par \par

## 2021-11-16 NOTE — END OF VISIT
[FreeTextEntry3] : All medical record entries made by the Scribe were at my, Dr. Bob's, discretion and personally dictated by me on 11/16/2021. I have reviewed the chart and agree that the record accurately reflects my personal performance of the history, physical exam, assessment and plan. I have also personally directed, reviewed and agreed to the chart.\par

## 2021-11-19 RX ORDER — AMLODIPINE BESYLATE 2.5 MG/1
1 TABLET ORAL
Qty: 0 | Refills: 0 | DISCHARGE

## 2021-11-19 RX ORDER — OMEGA-3 ACID ETHYL ESTERS 1 G
1 CAPSULE ORAL
Qty: 0 | Refills: 0 | DISCHARGE

## 2021-11-19 RX ORDER — DOCUSATE SODIUM 100 MG
0 CAPSULE ORAL
Qty: 0 | Refills: 0 | DISCHARGE

## 2021-11-19 RX ORDER — BISOPROLOL FUMARATE 10 MG/1
1 TABLET, FILM COATED ORAL
Qty: 0 | Refills: 0 | DISCHARGE

## 2021-11-19 RX ORDER — ATORVASTATIN CALCIUM 80 MG/1
1 TABLET, FILM COATED ORAL
Qty: 0 | Refills: 0 | DISCHARGE

## 2021-11-19 NOTE — ASU PATIENT PROFILE, ADULT - FALLEN IN THE PAST
[Well-Appearing] : well-appearing [No Acute Distress] : no acute distress [Well Nourished] : well nourished [Well Developed] : well developed [Normal Outer Ear/Nose] : the outer ears and nose were normal in appearance [Supple] : supple [No Respiratory Distress] : no respiratory distress  [Clear to Auscultation] : lungs were clear to auscultation bilaterally [Normal Rate] : normal rate  [Regular Rhythm] : with a regular rhythm [No Edema] : there was no peripheral edema [Soft] : abdomen soft [Non Tender] : non-tender [Normal Posterior Cervical Nodes] : no posterior cervical lymphadenopathy [Normal Anterior Cervical Nodes] : no anterior cervical lymphadenopathy [No CVA Tenderness] : no CVA  tenderness [No Spinal Tenderness] : no spinal tenderness [Cyanosis] : no cyanosis [Abnormal Temperature] : normal temperature [Normal Gait] : normal gait [Normal Affect] : the affect was normal [Normal Mood] : the mood was normal no

## 2021-11-19 NOTE — ASU PATIENT PROFILE, ADULT - NSICDXPASTMEDICALHX_GEN_ALL_CORE_FT
No PAST MEDICAL HISTORY:  Arthritis     Diabetes mellitus     HLD (hyperlipidemia)     HTN (hypertension)     Obesity     CYRUS on CPAP      PAST MEDICAL HISTORY:  Arthritis     Diabetes mellitus     H/O abdominal pain VOMITING    HLD (hyperlipidemia)     HTN (hypertension)     Obesity     CYRUS on CPAP

## 2021-11-22 ENCOUNTER — APPOINTMENT (OUTPATIENT)
Dept: BARIATRICS | Facility: HOSPITAL | Age: 61
End: 2021-11-22
Payer: MEDICAID

## 2021-11-22 ENCOUNTER — OUTPATIENT (OUTPATIENT)
Dept: OUTPATIENT SERVICES | Facility: HOSPITAL | Age: 61
LOS: 1 days | Discharge: HOME | End: 2021-11-22
Payer: MEDICAID

## 2021-11-22 VITALS
HEART RATE: 65 BPM | HEIGHT: 60 IN | RESPIRATION RATE: 16 BRPM | OXYGEN SATURATION: 100 % | WEIGHT: 200.84 LBS | DIASTOLIC BLOOD PRESSURE: 76 MMHG | TEMPERATURE: 98 F | SYSTOLIC BLOOD PRESSURE: 115 MMHG

## 2021-11-22 VITALS
HEART RATE: 68 BPM | OXYGEN SATURATION: 94 % | DIASTOLIC BLOOD PRESSURE: 63 MMHG | SYSTOLIC BLOOD PRESSURE: 120 MMHG | RESPIRATION RATE: 17 BRPM

## 2021-11-22 DIAGNOSIS — Z98.84 BARIATRIC SURGERY STATUS: Chronic | ICD-10-CM

## 2021-11-22 DIAGNOSIS — Z90.710 ACQUIRED ABSENCE OF BOTH CERVIX AND UTERUS: Chronic | ICD-10-CM

## 2021-11-22 DIAGNOSIS — Z98.890 OTHER SPECIFIED POSTPROCEDURAL STATES: Chronic | ICD-10-CM

## 2021-11-22 DIAGNOSIS — Z90.89 ACQUIRED ABSENCE OF OTHER ORGANS: Chronic | ICD-10-CM

## 2021-11-22 PROBLEM — Z87.898 PERSONAL HISTORY OF OTHER SPECIFIED CONDITIONS: Chronic | Status: ACTIVE | Noted: 2021-11-19

## 2021-11-22 LAB — SARS-COV-2 RNA SPEC QL NAA+PROBE: SIGNIFICANT CHANGE UP

## 2021-11-22 PROCEDURE — 43247 EGD REMOVE FOREIGN BODY: CPT

## 2021-11-22 PROCEDURE — 43249 ESOPH EGD DILATION <30 MM: CPT

## 2021-11-22 PROCEDURE — 43249 ESOPH EGD DILATION <30 MM: CPT | Mod: 58,GC

## 2021-11-22 PROCEDURE — C1726: CPT

## 2021-11-22 PROCEDURE — 87635 SARS-COV-2 COVID-19 AMP PRB: CPT

## 2021-11-22 NOTE — ASU DISCHARGE PLAN (ADULT/PEDIATRIC) - CARE PROVIDER_API CALL
Eric Bob)  Surgery  [address] [id number]  Phone: [phone]  Fax: [phone]  Established Patient  Follow Up Time:

## 2021-11-22 NOTE — ASU DISCHARGE PLAN (ADULT/PEDIATRIC) - NS MD DC FALL RISK RISK
For information on Fall & Injury Prevention, visit: [email or web address] OR  [email or web address] OR  https://www.cdc.gov/steadi/patient.html

## 2021-11-22 NOTE — PACU DISCHARGE NOTE - COMMENTS
Discharge instructions given to patient and to daughter, copies given, ambulating steadily, tolerating clear liquids, iv removed intact, no signs of inflamammation, , Transported to [facility] via wheelchair accompanied home by daughter

## 2021-11-22 NOTE — BRIEF OPERATIVE NOTE - OPERATION/FINDINGS
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 Held at 12mm for 2mins.  Suture seen traversing anastomosis. Cut with biopsy forceps. Portion of suture removed

## 2022-01-11 ENCOUNTER — APPOINTMENT (OUTPATIENT)
Dept: BARIATRICS | Facility: CLINIC | Age: 62
End: 2022-01-11

## 2025-04-05 ENCOUNTER — NON-APPOINTMENT (OUTPATIENT)
Age: 65
End: 2025-04-05